# Patient Record
Sex: MALE | Race: WHITE | NOT HISPANIC OR LATINO | Employment: OTHER | URBAN - METROPOLITAN AREA
[De-identification: names, ages, dates, MRNs, and addresses within clinical notes are randomized per-mention and may not be internally consistent; named-entity substitution may affect disease eponyms.]

---

## 2019-02-15 ENCOUNTER — HOSPITAL ENCOUNTER (EMERGENCY)
Facility: HOSPITAL | Age: 44
Discharge: HOME | End: 2019-02-15
Attending: EMERGENCY MEDICINE | Admitting: EMERGENCY MEDICINE
Payer: COMMERCIAL

## 2019-02-15 ENCOUNTER — APPOINTMENT (EMERGENCY)
Dept: RADIOLOGY | Facility: HOSPITAL | Age: 44
End: 2019-02-15
Payer: COMMERCIAL

## 2019-02-15 VITALS
HEIGHT: 73 IN | TEMPERATURE: 96.7 F | HEART RATE: 68 BPM | DIASTOLIC BLOOD PRESSURE: 72 MMHG | SYSTOLIC BLOOD PRESSURE: 117 MMHG | BODY MASS INDEX: 28.49 KG/M2 | WEIGHT: 215 LBS | OXYGEN SATURATION: 96 % | RESPIRATION RATE: 16 BRPM

## 2019-02-15 DIAGNOSIS — R23.3 PETECHIAL RASH: ICD-10-CM

## 2019-02-15 DIAGNOSIS — B37.89 CANDIDA RASH OF GROIN: ICD-10-CM

## 2019-02-15 DIAGNOSIS — R79.1 ELEVATED PARTIAL THROMBOPLASTIN TIME (PTT): ICD-10-CM

## 2019-02-15 DIAGNOSIS — R53.83 FATIGUE: ICD-10-CM

## 2019-02-15 DIAGNOSIS — R53.1 GENERALIZED WEAKNESS: Primary | ICD-10-CM

## 2019-02-15 LAB
ALBUMIN SERPL BCP-MCNC: 4.3 G/DL (ref 3.5–5.7)
ALP SERPL-CCNC: 80 U/L (ref 40–150)
ALT SERPL W P-5'-P-CCNC: 25 U/L (ref 7–52)
ANION GAP SERPL CALCULATED.3IONS-SCNC: 7 MMOL/L (ref 4–13)
APTT PPP: 53 SECONDS (ref 26–38)
AST SERPL W P-5'-P-CCNC: 20 U/L (ref 13–39)
BACTERIA UR QL AUTO: ABNORMAL /HPF
BASOPHILS # BLD AUTO: 0 THOUSANDS/ΜL (ref 0–0.1)
BASOPHILS NFR BLD AUTO: 1 % (ref 0–2)
BILIRUB SERPL-MCNC: 0.4 MG/DL (ref 0.2–1)
BILIRUB UR QL STRIP: NEGATIVE
BUN SERPL-MCNC: 14 MG/DL (ref 7–25)
CALCIUM SERPL-MCNC: 9 MG/DL (ref 8.6–10.5)
CHLORIDE SERPL-SCNC: 101 MMOL/L (ref 98–107)
CLARITY UR: CLEAR
CO2 SERPL-SCNC: 29 MMOL/L (ref 21–31)
COLOR UR: YELLOW
CREAT SERPL-MCNC: 1.07 MG/DL (ref 0.7–1.3)
CRP SERPL QL: <3 MG/L
EOSINOPHIL # BLD AUTO: 0.2 THOUSAND/ΜL (ref 0–0.61)
EOSINOPHIL NFR BLD AUTO: 4 % (ref 0–5)
ERYTHROCYTE [DISTWIDTH] IN BLOOD BY AUTOMATED COUNT: 14 % (ref 11.5–14.5)
ERYTHROCYTE [SEDIMENTATION RATE] IN BLOOD: 2 MM/HOUR (ref 0–15)
FLUAV AG SPEC QL IA: NEGATIVE
FLUAV AG SPEC QL: NOT DETECTED
FLUBV AG SPEC QL IA: NEGATIVE
FLUBV AG SPEC QL: NOT DETECTED
GFR SERPL CREATININE-BSD FRML MDRD: 84 ML/MIN/1.73SQ M
GLUCOSE SERPL-MCNC: 91 MG/DL (ref 65–99)
GLUCOSE UR STRIP-MCNC: NEGATIVE MG/DL
HCT VFR BLD AUTO: 47.5 % (ref 42–47)
HGB BLD-MCNC: 15.8 G/DL (ref 14–18)
HGB UR QL STRIP.AUTO: ABNORMAL
INR PPP: 0.96 (ref 0.9–1.5)
KETONES UR STRIP-MCNC: NEGATIVE MG/DL
LEUKOCYTE ESTERASE UR QL STRIP: NEGATIVE
LYMPHOCYTES # BLD AUTO: 1.3 THOUSANDS/ΜL (ref 0.6–4.47)
LYMPHOCYTES NFR BLD AUTO: 21 % (ref 21–51)
MCH RBC QN AUTO: 29.1 PG (ref 26–34)
MCHC RBC AUTO-ENTMCNC: 33.2 G/DL (ref 31–37)
MCV RBC AUTO: 88 FL (ref 81–99)
MONOCYTES # BLD AUTO: 0.6 THOUSAND/ΜL (ref 0.17–1.22)
MONOCYTES NFR BLD AUTO: 9 % (ref 2–12)
MUCOUS THREADS UR QL AUTO: ABNORMAL
NEUTROPHILS # BLD AUTO: 4.1 THOUSANDS/ΜL (ref 1.4–6.5)
NEUTS SEG NFR BLD AUTO: 66 % (ref 42–75)
NITRITE UR QL STRIP: NEGATIVE
NON-SQ EPI CELLS URNS QL MICRO: ABNORMAL /HPF
NRBC BLD AUTO-RTO: 0 /100 WBCS
PH UR STRIP.AUTO: 6 [PH] (ref 5–8)
PLATELET # BLD AUTO: 271 THOUSANDS/UL (ref 149–390)
PMV BLD AUTO: 7.7 FL (ref 8.6–11.7)
POTASSIUM SERPL-SCNC: 3.8 MMOL/L (ref 3.5–5.5)
PROT SERPL-MCNC: 6.7 G/DL (ref 6.4–8.9)
PROT UR STRIP-MCNC: NEGATIVE MG/DL
PROTHROMBIN TIME: 11.1 SECONDS (ref 10.2–13)
RBC # BLD AUTO: 5.43 MILLION/UL (ref 4.3–5.9)
RBC #/AREA URNS AUTO: ABNORMAL /HPF
RSV B RNA SPEC QL NAA+PROBE: NOT DETECTED
SODIUM SERPL-SCNC: 137 MMOL/L (ref 134–143)
SP GR UR STRIP.AUTO: 1.02 (ref 1–1.03)
UROBILINOGEN UR QL STRIP.AUTO: 0.2 E.U./DL
WBC # BLD AUTO: 6.3 THOUSAND/UL (ref 4.8–10.8)
WBC #/AREA URNS AUTO: ABNORMAL /HPF

## 2019-02-15 PROCEDURE — 99285 EMERGENCY DEPT VISIT HI MDM: CPT

## 2019-02-15 PROCEDURE — 86664 EPSTEIN-BARR NUCLEAR ANTIGEN: CPT | Performed by: PHYSICIAN ASSISTANT

## 2019-02-15 PROCEDURE — 71046 X-RAY EXAM CHEST 2 VIEWS: CPT

## 2019-02-15 PROCEDURE — 86645 CMV ANTIBODY IGM: CPT | Performed by: PHYSICIAN ASSISTANT

## 2019-02-15 PROCEDURE — 86663 EPSTEIN-BARR ANTIBODY: CPT | Performed by: PHYSICIAN ASSISTANT

## 2019-02-15 PROCEDURE — 85730 THROMBOPLASTIN TIME PARTIAL: CPT | Performed by: PHYSICIAN ASSISTANT

## 2019-02-15 PROCEDURE — 85025 COMPLETE CBC W/AUTO DIFF WBC: CPT | Performed by: PHYSICIAN ASSISTANT

## 2019-02-15 PROCEDURE — 81003 URINALYSIS AUTO W/O SCOPE: CPT | Performed by: PHYSICIAN ASSISTANT

## 2019-02-15 PROCEDURE — 36415 COLL VENOUS BLD VENIPUNCTURE: CPT | Performed by: PHYSICIAN ASSISTANT

## 2019-02-15 PROCEDURE — 80053 COMPREHEN METABOLIC PANEL: CPT | Performed by: PHYSICIAN ASSISTANT

## 2019-02-15 PROCEDURE — 86140 C-REACTIVE PROTEIN: CPT | Performed by: PHYSICIAN ASSISTANT

## 2019-02-15 PROCEDURE — 86617 LYME DISEASE ANTIBODY: CPT | Performed by: PHYSICIAN ASSISTANT

## 2019-02-15 PROCEDURE — 87631 RESP VIRUS 3-5 TARGETS: CPT | Performed by: PHYSICIAN ASSISTANT

## 2019-02-15 PROCEDURE — 85610 PROTHROMBIN TIME: CPT | Performed by: PHYSICIAN ASSISTANT

## 2019-02-15 PROCEDURE — 86644 CMV ANTIBODY: CPT | Performed by: PHYSICIAN ASSISTANT

## 2019-02-15 PROCEDURE — 81001 URINALYSIS AUTO W/SCOPE: CPT | Performed by: PHYSICIAN ASSISTANT

## 2019-02-15 PROCEDURE — 85652 RBC SED RATE AUTOMATED: CPT | Performed by: PHYSICIAN ASSISTANT

## 2019-02-15 PROCEDURE — 86665 EPSTEIN-BARR CAPSID VCA: CPT | Performed by: PHYSICIAN ASSISTANT

## 2019-02-15 RX ORDER — CLOTRIMAZOLE 1 %
CREAM (GRAM) TOPICAL
Qty: 15 G | Refills: 0 | Status: SHIPPED | OUTPATIENT
Start: 2019-02-15 | End: 2019-05-14 | Stop reason: ALTCHOICE

## 2019-02-15 NOTE — ED PROVIDER NOTES
History  Chief Complaint   Patient presents with    Fatigue    Generalized Body Aches    Weakness - Generalized     Patient presents emergency room with wife after a 2 week onset of generalized body rash  He states then over the past 3-4 days he has developed increased fatigue and generalized muscle aches  He denies any additional symptoms chest pain shortness of breath headache lightheaded dizziness nausea vomiting abdominal pain urinary complaints  Has not been outside of the country denies recent camping any recent sick contacts does state he is outdoors a lot but denies any tick or additional blood bites  Denies family history of clotting or blood disorders  Patient states he took some Tylenol with minimal relief no additional treatment  He states 2 weeks ago and the rash began he saw his PCP was given Diflucan for fungal rash but rashes not disappeared at this point  Denies fevers or chills at home wife is concerned due to his fatigue and they presents emergency room for further evaluation        History provided by:  Patient and spouse   used: No    Fatigue   Severity:  Moderate  Onset quality:  Sudden  Duration:  3 days  Timing:  Constant  Progression:  Unchanged  Chronicity:  New  Relieved by:  Nothing  Worsened by:  Nothing  Ineffective treatments:  Medication (tylenol)  Associated symptoms: myalgias    Associated symptoms: no abdominal pain, no arthralgias, no ataxia, no chest pain, no cough, no diarrhea, no difficulty walking, no dizziness, no dysphagia, no dysuria, no numbness in extremities, no fever, no foul-smelling urine, no frequency, no headaches, no nausea, no near-syncope, no seizures, no shortness of breath, no stroke symptoms, no syncope, no urgency, no vision change and no vomiting    Risk factors: no anemia, no coronary artery disease, no diabetes, no family hx of stroke, no heart disease, no neurologic disease, no new medications and no recent stressors      No Known Allergies      None       History reviewed  No pertinent past medical history  Past Surgical History:   Procedure Laterality Date    ORCHIECTOMY Right        History reviewed  No pertinent family history  I have reviewed and agree with the history as documented  Social History     Tobacco Use    Smoking status: Never Smoker    Smokeless tobacco: Never Used   Substance Use Topics    Alcohol use: Not Currently     Frequency: Never    Drug use: Never        Review of Systems   Constitutional: Positive for fatigue  Negative for chills, diaphoresis and fever  HENT: Negative for congestion, ear pain, rhinorrhea, sneezing and sore throat  Respiratory: Negative for cough, shortness of breath, wheezing and stridor  Cardiovascular: Negative for chest pain, palpitations, leg swelling, syncope and near-syncope  Gastrointestinal: Negative for abdominal distention, abdominal pain, blood in stool, constipation, diarrhea, dysphagia, nausea and vomiting  Genitourinary: Negative for difficulty urinating, dysuria, frequency, hematuria and urgency  Musculoskeletal: Positive for myalgias  Negative for arthralgias, gait problem and neck pain  Skin: Positive for rash  Neurological: Positive for weakness  Negative for dizziness, seizures, syncope, facial asymmetry, speech difficulty, light-headedness, numbness and headaches  All other systems reviewed and are negative  Physical Exam  Physical Exam   Constitutional: He is oriented to person, place, and time  He appears well-developed and well-nourished  HENT:   Head: Normocephalic and atraumatic  Right Ear: External ear normal    Left Ear: External ear normal    Nose: Nose normal    Mouth/Throat: Oropharynx is clear and moist  No oropharyngeal exudate  Eyes: Pupils are equal, round, and reactive to light  Conjunctivae and EOM are normal    Neck: Normal range of motion  Neck supple  No tracheal deviation present     Cardiovascular: Normal rate, regular rhythm, normal heart sounds and intact distal pulses  No murmur heard  Pulmonary/Chest: Effort normal and breath sounds normal  No respiratory distress  He has no wheezes  He has no rales  Abdominal: Soft  Bowel sounds are normal  He exhibits no distension  There is no tenderness  Musculoskeletal: Normal range of motion  He exhibits no edema or tenderness  Neurological: He is alert and oriented to person, place, and time  Skin: Skin is warm and dry  Petechiae noted  No abrasion, no bruising, no ecchymosis, no laceration and no rash noted  Rash is not urticarial  No erythema  Generalized petechial body rash sparing the face, palms and soles and throat  Additionally in the groin patient has slightly erythematous fungal appearing rash does not extend beyond the groin  Nursing note and vitals reviewed  Vital Signs  ED Triage Vitals [02/15/19 1324]   Temperature Pulse Respirations Blood Pressure SpO2   (!) 96 7 °F (35 9 °C) 78 16 121/83 96 %      Temp Source Heart Rate Source Patient Position - Orthostatic VS BP Location FiO2 (%)   Temporal Monitor Sitting Left arm --      Pain Score       9           Vitals:    02/15/19 1324   BP: 121/83   Pulse: 78   Patient Position - Orthostatic VS: Sitting       Visual Acuity      ED Medications  Medications - No data to display    Diagnostic Studies  Results Reviewed     Procedure Component Value Units Date/Time    UA w Reflex to Microscopic w Reflex to Culture [[id number]] Collected:  02/15/19 1531    Lab Status:   In process Specimen:  Urine, Clean Catch Updated:  02/15/19 1551    Sedimentation rate, automated [[id number]]  (Normal) Collected:  02/15/19 1418    Lab Status:  Final result Specimen:  Blood from Arm, Left Updated:  02/15/19 1512     Sed Rate 2 mm/hour     Comprehensive metabolic panel [[id number]] Collected:  02/15/19 1418    Lab Status:  Final result Specimen:  Blood from Arm, Left Updated:  02/15/19 1456     Sodium 137 mmol/L Potassium 3 8 mmol/L      Chloride 101 mmol/L      CO2 29 mmol/L      ANION GAP 7 mmol/L      BUN 14 mg/dL      Creatinine 1 07 mg/dL      Glucose 91 mg/dL      Calcium 9 0 mg/dL      AST 20 U/L      ALT 25 U/L      Alkaline Phosphatase 80 U/L      Total Protein 6 7 g/dL      Albumin 4 3 g/dL      Total Bilirubin 0 40 mg/dL      eGFR 84 ml/min/1 73sq m     Narrative:       National Kidney Disease Education Program recommendations are as follows:  GFR calculation is accurate only with a steady state creatinine  Chronic Kidney disease less than 60 ml/min/1 73 sq  meters  Kidney failure less than 15 ml/min/1 73 sq  meters  Protime-INR [[id number]]  (Normal) Collected:  02/15/19 1418    Lab Status:  Final result Specimen:  Blood from Arm, Left Updated:  02/15/19 1451     Protime 11 1 seconds      INR 0 96    APTT [[id number]]  (Abnormal) Collected:  02/15/19 1418    Lab Status:  Final result Specimen:  Blood from Arm, Left Updated:  02/15/19 1451     PTT 53 seconds     CBC and differential [[id number]]  (Abnormal) Collected:  02/15/19 1418    Lab Status:  Final result Specimen:  Blood from Arm, Left Updated:  02/15/19 1438     WBC 6 30 Thousand/uL      RBC 5 43 Million/uL      Hemoglobin 15 8 g/dL      Hematocrit 47 5 %      MCV 88 fL      MCH 29 1 pg      MCHC 33 2 g/dL      RDW 14 0 %      MPV 7 7 fL      Platelets 119 Thousands/uL      nRBC 0 /100 WBCs      Neutrophils Relative 66 %      Lymphocytes Relative 21 %      Monocytes Relative 9 %      Eosinophils Relative 4 %      Basophils Relative 1 %      Neutrophils Absolute 4 10 Thousands/µL      Lymphocytes Absolute 1 30 Thousands/µL      Monocytes Absolute 0 60 Thousand/µL      Eosinophils Absolute 0 20 Thousand/µL      Basophils Absolute 0 00 Thousands/µL     C-reactive protein [[id number]] Collected:  02/15/19 1418    Lab Status:   In process Specimen:  Blood from Arm, Left Updated:  02/15/19 1432    CMV IgG/IgM Antibodies [[id number]] Collected:  02/15/19 1418 Lab Status: In process Specimen:  Blood from Arm, Left Updated:  02/15/19 1432    EBV acute panel [[id number]] Collected:  02/15/19 1418    Lab Status: In process Specimen:  Blood from Arm, Left Updated:  02/15/19 1432    Lyme disease, western blot [[id number]] Collected:  02/15/19 1418    Lab Status: In process Specimen:  Blood from Arm, Left Updated:  02/15/19 1432    Rapid Influenza Screen with Reflex PCR [[id number]]  (Normal) Collected:  02/15/19 1336    Lab Status:  Final result Specimen:  Nasopharyngeal Swab Updated:  02/15/19 1407     Rapid Influenza A Ag Negative     Rapid Influenza B Ag Negative    INFLUENZA A/B AND RSV, PCR [[id number]] Collected:  02/15/19 1336    Lab Status: In process Specimen:  Nasopharyngeal Swab Updated:  02/15/19 1407                 XR chest 2 views    (Results Pending)              Procedures  Procedures       Phone Contacts  ED Phone Contact    ED Course  ED Course as of Feb 15 1555   Fri Feb 15, 2019   1539 Discussed with patient and wife elevation and PTT  Patient again denied family history of bleeding or clotting disorders  Discussed with patient no acute findings at this time requiring further inpatient stay advised follow up ASAP with PCP for further testing  We will call with results of pending labs if positive  Advised also follow up with hematologist in the future  Return to ER for acute worsening symptoms  Avoid NSAIDs at this time                                    MDM  Number of Diagnoses or Management Options  Candida rash of groin: new and does not require workup  Elevated partial thromboplastin time (PTT): new and requires workup  Fatigue: new and requires workup  Generalized weakness: new and requires workup  Petechial rash: new and requires workup     Amount and/or Complexity of Data Reviewed  Clinical lab tests: reviewed and ordered  Tests in the radiology section of CPT®: ordered and reviewed  Independent visualization of images, tracings, or specimens: yes    Risk of Complications, Morbidity, and/or Mortality  Presenting problems: moderate  Diagnostic procedures: moderate  Management options: moderate    Patient Progress  Patient progress: stable      Disposition  Final diagnoses:   Generalized weakness   Fatigue   Petechial rash   Elevated partial thromboplastin time (PTT)   Candida rash of groin     Time reflects when diagnosis was documented in both MDM as applicable and the Disposition within this note     Time User Action Codes Description Comment    2/15/2019  3:41 PM Zannie Bussing Add [R53 1] Generalized weakness     2/15/2019  3:41 PM Gaynel Burows M Add [R53 83] Fatigue     2/15/2019  3:42 PM Zannie Bussing Add [R23 3] Petechial rash     2/15/2019  3:44 PM Gaynel Burows M Add [R79 1] Elevated partial thromboplastin time (PTT)     2/15/2019  3:52 PM Gaynel Burows M Add [B37 89] Candida rash of groin       ED Disposition     ED Disposition Condition Date/Time Comment    Discharge Stable Fri Feb 15, 2019  3:41 PM Elaine Mccrary [facility] discharge to home/self care  Follow-up Information     Follow up With Specialties Details Why Contact Info Additional Information    Jeny Michelle, DO Family Medicine In 1 week If symptoms worsen return to ER  [address] [facility] [phone]       Rodney Almazan [facility] In 1 week If symptoms worsen  [address]ing ALDANA [address] [facility], [address], [place], [address]          Patient's Medications   Discharge Prescriptions    CLOTRIMAZOLE (LOTRIMIN) 1 % CREAM    Apply to affected area 2 times daily       Start Date: 2/15/2019 End Date: --       Order Dose: --       Quantity: 15 g    Refills: 0     No discharge procedures on file      ED Provider  Electronically Signed by           Jessie Stewart PA-C  02/15/19 1799

## 2019-02-16 LAB
CMV IGG SERPL IA-ACNC: >10 U/ML (ref 0–0.59)
CMV IGM SERPL IA-ACNC: <30 AU/ML (ref 0–29.9)
EBV EA IGG SER-ACNC: >150 U/ML (ref 0–8.9)
EBV NA IGG SER IA-ACNC: 472 U/ML (ref 0–17.9)
EBV PATRN SPEC IB-IMP: ABNORMAL
EBV VCA IGG SER IA-ACNC: 600 U/ML (ref 0–17.9)
EBV VCA IGM SER IA-ACNC: <36 U/ML (ref 0–35.9)

## 2019-02-17 LAB
B BURGDOR IGG PATRN SER IB-IMP: NEGATIVE
B BURGDOR IGM PATRN SER IB-IMP: NEGATIVE
B BURGDOR18KD IGG SER QL IB: ABNORMAL
B BURGDOR23KD IGG SER QL IB: PRESENT
B BURGDOR23KD IGM SER QL IB: ABNORMAL
B BURGDOR28KD IGG SER QL IB: ABNORMAL
B BURGDOR30KD IGG SER QL IB: ABNORMAL
B BURGDOR39KD IGG SER QL IB: ABNORMAL
B BURGDOR39KD IGM SER QL IB: ABNORMAL
B BURGDOR41KD IGG SER QL IB: ABNORMAL
B BURGDOR41KD IGM SER QL IB: ABNORMAL
B BURGDOR45KD IGG SER QL IB: ABNORMAL
B BURGDOR58KD IGG SER QL IB: ABNORMAL
B BURGDOR66KD IGG SER QL IB: ABNORMAL
B BURGDOR93KD IGG SER QL IB: ABNORMAL

## 2019-02-18 ENCOUNTER — TELEPHONE (OUTPATIENT)
Dept: EMERGENCY DEPT | Facility: HOSPITAL | Age: 44
End: 2019-02-18

## 2019-05-13 ENCOUNTER — OFFICE VISIT (OUTPATIENT)
Dept: URGENT CARE | Facility: CLINIC | Age: 44
End: 2019-05-13
Payer: COMMERCIAL

## 2019-05-13 ENCOUNTER — APPOINTMENT (OUTPATIENT)
Dept: RADIOLOGY | Facility: CLINIC | Age: 44
End: 2019-05-13
Payer: COMMERCIAL

## 2019-05-13 VITALS
OXYGEN SATURATION: 99 % | DIASTOLIC BLOOD PRESSURE: 68 MMHG | SYSTOLIC BLOOD PRESSURE: 110 MMHG | RESPIRATION RATE: 20 BRPM | TEMPERATURE: 97 F | HEART RATE: 88 BPM

## 2019-05-13 DIAGNOSIS — M25.562 ACUTE PAIN OF LEFT KNEE: Primary | ICD-10-CM

## 2019-05-13 DIAGNOSIS — M25.562 ACUTE PAIN OF LEFT KNEE: ICD-10-CM

## 2019-05-13 PROCEDURE — 73564 X-RAY EXAM KNEE 4 OR MORE: CPT

## 2019-05-13 PROCEDURE — 99203 OFFICE O/P NEW LOW 30 MIN: CPT | Performed by: PHYSICIAN ASSISTANT

## 2019-05-14 VITALS
RESPIRATION RATE: 16 BRPM | WEIGHT: 215 LBS | DIASTOLIC BLOOD PRESSURE: 75 MMHG | HEIGHT: 73 IN | HEART RATE: 86 BPM | BODY MASS INDEX: 28.49 KG/M2 | SYSTOLIC BLOOD PRESSURE: 112 MMHG

## 2019-05-14 DIAGNOSIS — M25.462 EFFUSION OF LEFT KNEE: ICD-10-CM

## 2019-05-14 DIAGNOSIS — M25.362 KNEE INSTABILITY, LEFT: ICD-10-CM

## 2019-05-14 DIAGNOSIS — S89.92XA LEFT KNEE INJURY, INITIAL ENCOUNTER: Primary | ICD-10-CM

## 2019-05-14 PROCEDURE — 20610 DRAIN/INJ JOINT/BURSA W/O US: CPT | Performed by: FAMILY MEDICINE

## 2019-05-14 PROCEDURE — 99213 OFFICE O/P EST LOW 20 MIN: CPT | Performed by: FAMILY MEDICINE

## 2019-05-14 RX ORDER — TRIAMCINOLONE ACETONIDE 40 MG/ML
40 INJECTION, SUSPENSION INTRA-ARTICULAR; INTRAMUSCULAR
Status: COMPLETED | OUTPATIENT
Start: 2019-05-14 | End: 2019-05-14

## 2019-05-14 RX ORDER — MELOXICAM 15 MG/1
15 TABLET ORAL DAILY
Qty: 30 TABLET | Refills: 1 | Status: SHIPPED | OUTPATIENT
Start: 2019-05-14 | End: 2019-06-25 | Stop reason: ALTCHOICE

## 2019-05-14 RX ORDER — LIDOCAINE HYDROCHLORIDE 10 MG/ML
4 INJECTION, SOLUTION INFILTRATION; PERINEURAL
Status: COMPLETED | OUTPATIENT
Start: 2019-05-14 | End: 2019-05-14

## 2019-05-14 RX ORDER — LIDOCAINE HYDROCHLORIDE 10 MG/ML
5 INJECTION, SOLUTION INFILTRATION; PERINEURAL
Status: COMPLETED | OUTPATIENT
Start: 2019-05-14 | End: 2019-05-14

## 2019-05-14 RX ADMIN — TRIAMCINOLONE ACETONIDE 40 MG: 40 INJECTION, SUSPENSION INTRA-ARTICULAR; INTRAMUSCULAR at 11:20

## 2019-05-14 RX ADMIN — LIDOCAINE HYDROCHLORIDE 5 ML: 10 INJECTION, SOLUTION INFILTRATION; PERINEURAL at 11:20

## 2019-05-14 RX ADMIN — LIDOCAINE HYDROCHLORIDE 4 ML: 10 INJECTION, SOLUTION INFILTRATION; PERINEURAL at 11:20

## 2019-05-16 ENCOUNTER — EVALUATION (OUTPATIENT)
Dept: PHYSICAL THERAPY | Facility: CLINIC | Age: 44
End: 2019-05-16
Payer: COMMERCIAL

## 2019-05-16 DIAGNOSIS — S89.92XD LEFT KNEE INJURY, SUBSEQUENT ENCOUNTER: Primary | ICD-10-CM

## 2019-05-16 DIAGNOSIS — M25.362 PATELLAR INSTABILITY OF LEFT KNEE: ICD-10-CM

## 2019-05-16 PROCEDURE — 97535 SELF CARE MNGMENT TRAINING: CPT | Performed by: PHYSICAL MEDICINE & REHABILITATION

## 2019-05-16 PROCEDURE — 97110 THERAPEUTIC EXERCISES: CPT | Performed by: PHYSICAL MEDICINE & REHABILITATION

## 2019-05-16 PROCEDURE — 97161 PT EVAL LOW COMPLEX 20 MIN: CPT | Performed by: PHYSICAL MEDICINE & REHABILITATION

## 2019-05-21 ENCOUNTER — OFFICE VISIT (OUTPATIENT)
Dept: PHYSICAL THERAPY | Facility: CLINIC | Age: 44
End: 2019-05-21
Payer: COMMERCIAL

## 2019-05-21 DIAGNOSIS — M25.362 PATELLAR INSTABILITY OF LEFT KNEE: ICD-10-CM

## 2019-05-21 DIAGNOSIS — S89.92XD LEFT KNEE INJURY, SUBSEQUENT ENCOUNTER: Primary | ICD-10-CM

## 2019-05-21 PROCEDURE — 97110 THERAPEUTIC EXERCISES: CPT | Performed by: PHYSICAL THERAPIST

## 2019-06-04 ENCOUNTER — OFFICE VISIT (OUTPATIENT)
Dept: PHYSICAL THERAPY | Facility: CLINIC | Age: 44
End: 2019-06-04
Payer: COMMERCIAL

## 2019-06-04 DIAGNOSIS — S89.92XD LEFT KNEE INJURY, SUBSEQUENT ENCOUNTER: Primary | ICD-10-CM

## 2019-06-04 DIAGNOSIS — M25.362 PATELLAR INSTABILITY OF LEFT KNEE: ICD-10-CM

## 2019-06-04 PROCEDURE — 97110 THERAPEUTIC EXERCISES: CPT

## 2019-06-11 ENCOUNTER — OFFICE VISIT (OUTPATIENT)
Dept: PHYSICAL THERAPY | Facility: CLINIC | Age: 44
End: 2019-06-11
Payer: COMMERCIAL

## 2019-06-11 DIAGNOSIS — M25.362 PATELLAR INSTABILITY OF LEFT KNEE: ICD-10-CM

## 2019-06-11 DIAGNOSIS — S89.92XD LEFT KNEE INJURY, SUBSEQUENT ENCOUNTER: Primary | ICD-10-CM

## 2019-06-11 PROCEDURE — 97110 THERAPEUTIC EXERCISES: CPT

## 2019-06-18 ENCOUNTER — APPOINTMENT (OUTPATIENT)
Dept: PHYSICAL THERAPY | Facility: CLINIC | Age: 44
End: 2019-06-18
Payer: COMMERCIAL

## 2019-06-25 ENCOUNTER — OFFICE VISIT (OUTPATIENT)
Dept: PHYSICAL THERAPY | Facility: CLINIC | Age: 44
End: 2019-06-25
Payer: COMMERCIAL

## 2019-06-25 ENCOUNTER — OFFICE VISIT (OUTPATIENT)
Dept: OBGYN CLINIC | Facility: CLINIC | Age: 44
End: 2019-06-25
Payer: COMMERCIAL

## 2019-06-25 VITALS
DIASTOLIC BLOOD PRESSURE: 79 MMHG | HEIGHT: 73 IN | RESPIRATION RATE: 16 BRPM | HEART RATE: 90 BPM | SYSTOLIC BLOOD PRESSURE: 115 MMHG | BODY MASS INDEX: 27.25 KG/M2 | WEIGHT: 205.6 LBS

## 2019-06-25 DIAGNOSIS — M25.362 KNEE INSTABILITY, LEFT: ICD-10-CM

## 2019-06-25 DIAGNOSIS — S89.92XD LEFT KNEE INJURY, SUBSEQUENT ENCOUNTER: Primary | ICD-10-CM

## 2019-06-25 DIAGNOSIS — M25.362 PATELLAR INSTABILITY OF LEFT KNEE: ICD-10-CM

## 2019-06-25 PROCEDURE — 99213 OFFICE O/P EST LOW 20 MIN: CPT | Performed by: FAMILY MEDICINE

## 2019-06-25 PROCEDURE — 97110 THERAPEUTIC EXERCISES: CPT

## 2019-07-30 ENCOUNTER — OFFICE VISIT (OUTPATIENT)
Dept: OBGYN CLINIC | Facility: CLINIC | Age: 44
End: 2019-07-30
Payer: COMMERCIAL

## 2019-07-30 VITALS
DIASTOLIC BLOOD PRESSURE: 73 MMHG | SYSTOLIC BLOOD PRESSURE: 111 MMHG | RESPIRATION RATE: 16 BRPM | WEIGHT: 209.6 LBS | HEIGHT: 73 IN | HEART RATE: 88 BPM | BODY MASS INDEX: 27.78 KG/M2

## 2019-07-30 DIAGNOSIS — R29.898 KNEE CLICKING: ICD-10-CM

## 2019-07-30 DIAGNOSIS — Z13.89 ENCOUNTER FOR IMAGING TO SCREEN FOR METAL PRIOR TO MRI: ICD-10-CM

## 2019-07-30 DIAGNOSIS — M25.562 ACUTE PAIN OF LEFT KNEE: Primary | ICD-10-CM

## 2019-07-30 PROCEDURE — 99213 OFFICE O/P EST LOW 20 MIN: CPT | Performed by: FAMILY MEDICINE

## 2019-07-30 NOTE — PROGRESS NOTES
Assessment/Plan:  Assessment/Plan   Diagnoses and all orders for this visit:    Acute pain of left knee  -     MRI knee left  wo contrast; Future    Knee clicking  -     MRI knee left  wo contrast; Future    Encounter for imaging to screen for metal prior to MRI  -     XR orbits for foreign body; Future        70-year-old active male with left knee pain and clicking more 10 weeks duration  Discussed with patient physical exam, impression and plan  Physical exam is noted for tenderness of the medial joint line and patellar undersurface  He has normal range of motion of the knee  There is pain at the medial aspect with Radha's  He is now more than 10 weeks since onset of pain and still symptomatic with pain and clicking of the knee despite conservative management a form of having had corticosteroid injection, meloxicam 15 mg once daily, and doing formal physical therapy since 05/16/2019  At this time I will refer him for MRI of left knee to evaluate for internal derangement, as surgical intervention may be warranted  He will follow up with me after getting MRI done  Subjective:   Patient ID: Alexus Lemus is a 38 y o  male  Chief Complaint   Patient presents with    Left Knee - Pain, Follow-up       70-year-old active male following up for left knee pain instability more than 10 months duration  He was last seen 5 weeks ago at which point he was advised to continue with formal physical therapy, joint supplements, and meloxicam 15 mg once daily  He has been doing formal physical therapy and home exercises since 05/16/2019  He has also been taking meloxicam 15 mg once daily  He reports mild improvement in his symptoms  He still has pain described as localized to the medial aspect of knee, intermittent, sharp, nonradiating, worse with twisting and bending, associated with swelling, and improved with rest   He has not had any new injury since his last visit  Knee Pain   This is a new problem   The current episode started more than 1 month ago  The problem occurs intermittently  The problem has been gradually improving  Associated symptoms include arthralgias and joint swelling  Pertinent negatives include no numbness or weakness  The symptoms are aggravated by twisting and walking  He has tried rest and NSAIDs (Corticosteroid injection, physical therapy) for the symptoms  The treatment provided mild relief  Review of Systems   Musculoskeletal: Positive for arthralgias and joint swelling  Neurological: Negative for weakness and numbness  Objective:  Vitals:    07/30/19 1317   BP: 111/73   BP Location: Left arm   Patient Position: Sitting   Cuff Size: Large   Pulse: 88   Resp: 16   Weight: 95 1 kg (209 lb 9 6 oz)   Height: 6' 1" (1 854 m)     Left Knee Exam     Muscle Strength   The patient has normal left knee strength  Tenderness   The patient is experiencing tenderness in the medial joint line (Patellar undersurface)  Range of Motion   The patient has normal left knee ROM  Tests   Radha:  Medial - positive   Varus: negative Valgus: negative    Other   Swelling: none  Effusion: no effusion present          Observations   Left Knee   Negative for effusion  Physical Exam   Constitutional: He is oriented to person, place, and time  He appears well-developed  No distress  HENT:   Head: Normocephalic and atraumatic  Eyes: Conjunctivae are normal    Neck: No tracheal deviation present  Cardiovascular: Normal rate  Pulmonary/Chest: Effort normal  No respiratory distress  Abdominal: He exhibits no distension  Musculoskeletal:        Left knee: He exhibits no effusion  Neurological: He is alert and oriented to person, place, and time  Skin: Skin is warm and dry  Psychiatric: He has a normal mood and affect  His behavior is normal    Nursing note and vitals reviewed

## 2019-08-06 ENCOUNTER — HOSPITAL ENCOUNTER (OUTPATIENT)
Dept: RADIOLOGY | Facility: HOSPITAL | Age: 44
Discharge: HOME | End: 2019-08-06

## 2019-08-06 DIAGNOSIS — Z13.89 ENCOUNTER FOR IMAGING TO SCREEN FOR METAL PRIOR TO MRI: ICD-10-CM

## 2019-08-07 ENCOUNTER — HOSPITAL ENCOUNTER (OUTPATIENT)
Dept: MRI IMAGING | Facility: HOSPITAL | Age: 44
Discharge: HOME | End: 2019-08-07
Payer: COMMERCIAL

## 2019-08-07 DIAGNOSIS — M25.562 ACUTE PAIN OF LEFT KNEE: ICD-10-CM

## 2019-08-07 DIAGNOSIS — R29.898 KNEE CLICKING: ICD-10-CM

## 2019-08-07 PROCEDURE — 73721 MRI JNT OF LWR EXTRE W/O DYE: CPT

## 2019-08-13 ENCOUNTER — OFFICE VISIT (OUTPATIENT)
Dept: OBGYN CLINIC | Facility: CLINIC | Age: 44
End: 2019-08-13
Payer: COMMERCIAL

## 2019-08-13 VITALS
SYSTOLIC BLOOD PRESSURE: 110 MMHG | WEIGHT: 209 LBS | DIASTOLIC BLOOD PRESSURE: 73 MMHG | HEIGHT: 73 IN | RESPIRATION RATE: 16 BRPM | HEART RATE: 92 BPM | BODY MASS INDEX: 27.7 KG/M2

## 2019-08-13 DIAGNOSIS — S83.242D OTHER TEAR OF MEDIAL MENISCUS OF LEFT KNEE AS CURRENT INJURY, SUBSEQUENT ENCOUNTER: Primary | ICD-10-CM

## 2019-08-13 PROCEDURE — 99213 OFFICE O/P EST LOW 20 MIN: CPT | Performed by: FAMILY MEDICINE

## 2019-08-13 NOTE — PROGRESS NOTES
Assessment/Plan:  Assessment/Plan   Diagnoses and all orders for this visit:    Other tear of medial meniscus of left knee as current injury, subsequent encounter  -     Ambulatory referral to Orthopedic Surgery; Future        69-year-old active male with left knee pain and clicking nearly 3 months duration  Discussed with patient MRI results, impression and plan  MRI of the left knee noted for blunting of the free edge of medial meniscus consistent with tear including apparent displaced meniscal fragment  He has nearly 3 months since onset of symptoms and still symptomatic with intermittent pain and difficulty with bending, and popping of the knee  I discussed with patient that given the findings of displaced fragment in the knee he should be evaluated by orthopedic surgeon to which he agreed  He may continue with physical activity as tolerated and I will refer him to orthopedic surgeon for further evaluation and recommendation treatment  Subjective:   Patient ID: Lori Hinojosa is a 38 y o  male  Chief Complaint   Patient presents with    Left Knee - Pain, Follow-up       69-year-old active male following up for left knee pain of 3 months duration  He was last seen 2 weeks ago which point he was referred for MRI of the knee  Today he reports still having symptoms described as pain and popping  Pain described as localized mainly to the medial aspect of knee, intermittent, achy and sometimes sharp, worse with bending and repetitive activity, and improved with rest   He has had mild buckling  He has done formal physical therapy has been continuing with home exercise  To this point his treatment course consisted of corticosteroid injection, meloxicam, and formal physical therapy  Knee Pain   This is a new problem  The current episode started more than 1 month ago  The problem occurs intermittently  The problem has been gradually improving  Associated symptoms include arthralgias   Pertinent negatives include no joint swelling, numbness or weakness  The symptoms are aggravated by twisting (Bending)  He has tried rest and NSAIDs (Physical therapy, corticosteroid injection) for the symptoms  The treatment provided moderate relief  Review of Systems   Musculoskeletal: Positive for arthralgias  Negative for joint swelling  Neurological: Negative for weakness and numbness  Objective:  Vitals:    08/13/19 1504   BP: 110/73   BP Location: Left arm   Patient Position: Sitting   Cuff Size: Large   Pulse: 92   Resp: 16   Weight: 94 8 kg (209 lb)   Height: 6' 1" (1 854 m)     Left Knee Exam     Muscle Strength   The patient has normal left knee strength  Tenderness   The patient is experiencing no tenderness  Range of Motion   Extension: normal   Flexion: 130             Physical Exam   Constitutional: He is oriented to person, place, and time  He appears well-developed  No distress  HENT:   Head: Normocephalic and atraumatic  Eyes: Conjunctivae are normal    Neck: No tracheal deviation present  Cardiovascular: Normal rate  Pulmonary/Chest: Effort normal  No respiratory distress  Abdominal: He exhibits no distension  Neurological: He is alert and oriented to person, place, and time  Skin: Skin is warm and dry  Psychiatric: He has a normal mood and affect  His behavior is normal    Nursing note and vitals reviewed  I have personally reviewed pertinent films in PACS and my interpretation is Medial meniscus tear

## 2019-08-14 ENCOUNTER — OFFICE VISIT (OUTPATIENT)
Dept: OBGYN CLINIC | Facility: CLINIC | Age: 44
End: 2019-08-14
Payer: COMMERCIAL

## 2019-08-14 VITALS
DIASTOLIC BLOOD PRESSURE: 78 MMHG | HEIGHT: 74 IN | BODY MASS INDEX: 26.69 KG/M2 | HEART RATE: 92 BPM | WEIGHT: 208 LBS | SYSTOLIC BLOOD PRESSURE: 117 MMHG

## 2019-08-14 DIAGNOSIS — M25.562 CHRONIC PAIN OF LEFT KNEE: Primary | ICD-10-CM

## 2019-08-14 DIAGNOSIS — S83.242D OTHER TEAR OF MEDIAL MENISCUS OF LEFT KNEE AS CURRENT INJURY, SUBSEQUENT ENCOUNTER: ICD-10-CM

## 2019-08-14 DIAGNOSIS — G89.29 CHRONIC PAIN OF LEFT KNEE: Primary | ICD-10-CM

## 2019-08-14 PROBLEM — S83.242A TEAR OF MEDIAL MENISCUS OF LEFT KNEE, CURRENT: Status: ACTIVE | Noted: 2019-08-14

## 2019-08-14 PROCEDURE — 99214 OFFICE O/P EST MOD 30 MIN: CPT | Performed by: ORTHOPAEDIC SURGERY

## 2019-08-14 RX ORDER — CLOTRIMAZOLE AND BETAMETHASONE DIPROPIONATE 10; .64 MG/G; MG/G
CREAM TOPICAL 2 TIMES DAILY
Status: ON HOLD | COMMUNITY
End: 2019-09-12 | Stop reason: ALTCHOICE

## 2019-08-14 RX ORDER — CEFAZOLIN SODIUM 2 G/50ML
2000 SOLUTION INTRAVENOUS ONCE
Status: CANCELLED | OUTPATIENT
Start: 2019-09-12

## 2019-08-14 NOTE — H&P (VIEW-ONLY)
ASSESSMENT/PLAN:    Diagnoses and all orders for this visit:    Chronic pain of left knee    Other tear of medial meniscus of left knee as current injury, subsequent encounter    Plan:  I discussed the risks, benefits, options and alternatives of treatment  Having failed conservative measures, I would recommend arthroscopic surgery  He is agreeable but would like to wait until mid September to proceed with surgery  I do not believe he needs any preoperative testing  He will be seen 1 week postoperatively for his 1st follow-up visit  He was instructed to contact me if questions or concerns were to arise prior to surgery  _____________________________________________________  CHIEF COMPLAINT:  Chief Complaint   Patient presents with    Left Knee - Pain, popping    Knee Pain     pt states he injured knee by twisting and then kneeling down, notice the popping and had diffiuclty strengthing and moving         SUBJECTIVE:  Gill Hanna is a 38y o  year old male who presents for evaluation of his left knee  He has been having symptoms for approximately 3 months, describing 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   He has failed to respond to physical therapy, over-the-counter analgesics and aspiration injection  An MRI was recently obtained and he now has been referred for orthopedic surgical evaluation and treatment  He complains of medial-sided knee pain  He does note improvement in symptoms, but continues to experience pain on a consistent basis  Pain is primarily triggered by twisting activities  He denies pain at rest   He denies any significant morning stiffness  He does note that when his pain is most significant, he feels as if his knee may give out on him      PAST MEDICAL HISTORY:  Past Medical History:   Diagnosis Date    Knee pain, left        PAST SURGICAL HISTORY:  Past Surgical History:   Procedure Laterality Date    ORCHIECTOMY Right FAMILY HISTORY:  Family History   Problem Relation Age of Onset    Diabetes Mother     No Known Problems Father     No Known Problems Sister     No Known Problems Brother        SOCIAL HISTORY:  Social History     Tobacco Use    Smoking status: Former Smoker    Smokeless tobacco: Never Used   Substance Use Topics    Alcohol use: Not Currently     Frequency: Never    Drug use: Never       MEDICATIONS:    Current Outpatient Medications:     clotrimazole-betamethasone (LOTRISONE) 1-0 05 % cream, Apply topically 2 (two) times a day, Disp: , Rfl:     ALLERGIES:  No Known Allergies    Review of systems:   Constitutional: Negative for fatigue, fever or loss of apetite  HENT: Negative  Respiratory: Negative for shortness of breath, dyspnea  Cardiovascular: Negative for chest pain/tightness  Gastrointestinal: Negative for abdominal pain, N/V  Endocrine: Negative for cold/heat intolerance, unexplained weight loss/gain  Genitourinary: Negative for flank pain, dysuria, hematuria  Musculoskeletal:  Positive as in the HPI   Skin: Negative for rash  Neurological:  Negative  Psychiatric/Behavioral: Negative for agitation  _____________________________________________________  PHYSICAL EXAMINATION:    Blood pressure 117/78, pulse 92, height 6' 2" (1 88 m), weight 94 3 kg (208 lb)  General: well developed and well nourished, alert, oriented times 3 and appears comfortable  HEENT: Benign, normocephalic, atraumatic  Cardiovascular:  Regular    Pulmonary: No wheezing or stridor  Abdomen: Soft, Nontender  Skin:  No lacerations or abrasions  Neurovascular: Motor and sensory exams are intact  Pulses are palpable  MUSCULOSKELETAL EXAMINATION:  Extremities: The left knee exam demonstrates good range of motion but with some mild pain during end range flexion  He does have medial joint line tenderness  The lateral joint line is nontender    Radha's test and Apley's compression/distraction tests are negative  Thessaly test is positive  Collateral and cruciate ligaments are stable  There is a minimal, if any, effusion noted  Patellofemoral symptoms are absent  There is no erythema or rubor  The remainder of the lower extremity examination, bilaterally, is benign  _____________________________________________________  STUDIES REVIEWED:  X-rays of his knee from May of 2019 demonstrated no significant degenerative disease or acute process  There is an effusion noted  The MRI demonstrates evidence of a meniscal tear medially as well as some minimal degenerative change  The reports were reviewed        Marisa Oh

## 2019-08-14 NOTE — PROGRESS NOTES
ASSESSMENT/PLAN:    Diagnoses and all orders for this visit:    Chronic pain of left knee    Other tear of medial meniscus of left knee as current injury, subsequent encounter    Plan:  I discussed the risks, benefits, options and alternatives of treatment  Having failed conservative measures, I would recommend arthroscopic surgery  He is agreeable but would like to wait until mid September to proceed with surgery  I do not believe he needs any preoperative testing  He will be seen 1 week postoperatively for his 1st follow-up visit  He was instructed to contact me if questions or concerns were to arise prior to surgery  _____________________________________________________  CHIEF COMPLAINT:  Chief Complaint   Patient presents with    Left Knee - Pain, popping    Knee Pain     pt states he injured knee by twisting and then kneeling down, notice the popping and had diffiuclty strengthing and moving         SUBJECTIVE:  Bre Carrasco is a 38y o  year old male who presents for evaluation of his left knee  He has been having symptoms for approximately 3 months, describing 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   He has failed to respond to physical therapy, over-the-counter analgesics and aspiration injection  An MRI was recently obtained and he now has been referred for orthopedic surgical evaluation and treatment  He complains of medial-sided knee pain  He does note improvement in symptoms, but continues to experience pain on a consistent basis  Pain is primarily triggered by twisting activities  He denies pain at rest   He denies any significant morning stiffness  He does note that when his pain is most significant, he feels as if his knee may give out on him      PAST MEDICAL HISTORY:  Past Medical History:   Diagnosis Date    Knee pain, left        PAST SURGICAL HISTORY:  Past Surgical History:   Procedure Laterality Date    ORCHIECTOMY Right FAMILY HISTORY:  Family History   Problem Relation Age of Onset    Diabetes Mother     No Known Problems Father     No Known Problems Sister     No Known Problems Brother        SOCIAL HISTORY:  Social History     Tobacco Use    Smoking status: Former Smoker    Smokeless tobacco: Never Used   Substance Use Topics    Alcohol use: Not Currently     Frequency: Never    Drug use: Never       MEDICATIONS:    Current Outpatient Medications:     clotrimazole-betamethasone (LOTRISONE) 1-0 05 % cream, Apply topically 2 (two) times a day, Disp: , Rfl:     ALLERGIES:  No Known Allergies    Review of systems:   Constitutional: Negative for fatigue, fever or loss of apetite  HENT: Negative  Respiratory: Negative for shortness of breath, dyspnea  Cardiovascular: Negative for chest pain/tightness  Gastrointestinal: Negative for abdominal pain, N/V  Endocrine: Negative for cold/heat intolerance, unexplained weight loss/gain  Genitourinary: Negative for flank pain, dysuria, hematuria  Musculoskeletal:  Positive as in the HPI   Skin: Negative for rash  Neurological:  Negative  Psychiatric/Behavioral: Negative for agitation  _____________________________________________________  PHYSICAL EXAMINATION:    Blood pressure 117/78, pulse 92, height 6' 2" (1 88 m), weight 94 3 kg (208 lb)  General: well developed and well nourished, alert, oriented times 3 and appears comfortable  HEENT: Benign, normocephalic, atraumatic  Cardiovascular:  Regular    Pulmonary: No wheezing or stridor  Abdomen: Soft, Nontender  Skin:  No lacerations or abrasions  Neurovascular: Motor and sensory exams are intact  Pulses are palpable  MUSCULOSKELETAL EXAMINATION:  Extremities: The left knee exam demonstrates good range of motion but with some mild pain during end range flexion  He does have medial joint line tenderness  The lateral joint line is nontender    Radha's test and Apley's compression/distraction tests are negative  Thessaly test is positive  Collateral and cruciate ligaments are stable  There is a minimal, if any, effusion noted  Patellofemoral symptoms are absent  There is no erythema or rubor  The remainder of the lower extremity examination, bilaterally, is benign  _____________________________________________________  STUDIES REVIEWED:  X-rays of his knee from May of 2019 demonstrated no significant degenerative disease or acute process  There is an effusion noted  The MRI demonstrates evidence of a meniscal tear medially as well as some minimal degenerative change  The reports were reviewed        Juan Salas

## 2019-09-06 ENCOUNTER — ANESTHESIA EVENT (OUTPATIENT)
Dept: PERIOP | Facility: HOSPITAL | Age: 44
End: 2019-09-06
Payer: COMMERCIAL

## 2019-09-06 NOTE — ANESTHESIA PREPROCEDURE EVALUATION
Review of Systems/Medical History  Patient summary reviewed  Chart reviewed      Cardiovascular   Pulmonary  Not a smoker ,        GI/Hepatic    No GERD ,             Endo/Other     GYN       Hematology   Musculoskeletal       Neurology   Psychology         Lab Results   Component Value Date    WBC 6 30 02/15/2019    HGB 15 8 02/15/2019    HCT 47 5 (H) 02/15/2019    MCV 88 02/15/2019     02/15/2019     Lab Results   Component Value Date    CALCIUM 9 0 02/15/2019    K 3 8 02/15/2019    CO2 29 02/15/2019     02/15/2019    BUN 14 02/15/2019    CREATININE 1 07 02/15/2019     Lab Results   Component Value Date    INR 0 96 02/15/2019    PROTIME 11 1 02/15/2019     Lab Results   Component Value Date    PTT 53 (H) 02/15/2019     Physical Exam    Airway    Mallampati score: IV  TM Distance: >3 FB  Neck ROM: full     Dental   No notable dental hx     Cardiovascular  Cardiovascular exam normal    Pulmonary  Pulmonary exam normal     Other Findings        Anesthesia Plan  ASA Score- 2     Anesthesia Type- general with ASA Monitors  Additional Monitors:   Airway Plan: LMA  Comment: I personally discussed risks and benefits to this anesthetic  All patient questions were answered  Pt agrees with anesthesia plan        Plan Factors-    Induction- intravenous  Postoperative Plan- Plan for postoperative opioid use  Planned trial extubation    Informed Consent- Anesthetic plan and risks discussed with patient  I personally reviewed this patient with the CRNA  Discussed and agreed on the Anesthesia Plan with the CRNA  Miki Lott

## 2019-09-11 NOTE — PRE-PROCEDURE INSTRUCTIONS
No outpatient medications have been marked as taking for the 9/12/19 encounter [facility] HOSPITAL Encounter)  My Surgical Experience    The following information was developed to assist you to prepare for your operation  What do I need to do before coming to the hospital?   Arrange for a responsible person to drive you to and from the hospital    Arrange care for your children at home  Children are not allowed in the recovery areas of the hospital   Plan to wear clothing that is easy to put on and take off  If you are having shoulder surgery, wear a shirt that buttons or zippers in the front  Bathing  o Shower the evening before and the morning of your surgery with an antibacterial soap  Please refer to the Pre Op Showering Instructions for Surgery Patients Sheet   o Remove nail polish and all body piercing jewelry  o Do not shave any body part for at least 24 hours before surgery-this includes face, arms, legs and upper body  Food  o Nothing to eat or drink after midnight the night before your surgery  This includes candy and chewing gum  o Exception: If your surgery is after 12:00pm (noon), you may have clear liquids such as 7-Up®, ginger ale, apple or cranberry juice, Jell-O®, water, or clear broth until 8:00 am  o Do not drink milk or juice with pulp on the morning before surgery  o Do not drink alcohol 24 hours before surgery  Medicine  o Follow instructions you received from your surgeon about which medicines you may take on the day of surgery  o If instructed to take medicine on the morning of surgery, take pills with just a small sip of water  Call your prescribing doctor for specific infroamtion on what to do if you take insulin    What should I bring to the hospital?    Bring:  Adri Terry or a walker, if you have them, for foot or knee surgery   A list of the daily medicines, vitamins, minerals, herbals and nutritional supplements you take   Include the dosages of medicines and the time you take them each day   Glasses, dentures or hearing aids   Minimal clothing; you will be wearing hospital sleepwear   Photo ID; required to verify your identity   If you have a Living Will or Power of , bring a copy of the documents   If you have an ostomy, bring an extra pouch and any supplies you use    Do not bring   Medicines or inhalers   Money, valuables or jewelry    What other information should I know about the day of surgery?  Notify your surgeons if you develop a cold, sore throat, cough, fever, rash or any other illness   Report to the Ambulatory Surgical/Same Day Surgery Unit   You will be instructed to stop at Registration only if you have not been pre-registered   Inform your  fi they do not stay that they will be asked by the staff to leave a phone number where they can be reached   Be available to be reached before surgery  In the event the operating room schedule changes, you may be asked to come in earlier or later than expected    *It is important to tell your doctor and others involved in your health care if you are taking or have been taking any non-prescription drugs, vitamins, minerals, herbals or other nutritional supplements   Any of these may interact with some food or medicines and cause a reaction

## 2019-09-12 ENCOUNTER — HOSPITAL ENCOUNTER (OUTPATIENT)
Facility: HOSPITAL | Age: 44
Setting detail: OUTPATIENT SURGERY
Discharge: HOME | End: 2019-09-12
Attending: ORTHOPAEDIC SURGERY | Admitting: ORTHOPAEDIC SURGERY
Payer: COMMERCIAL

## 2019-09-12 ENCOUNTER — ANESTHESIA (OUTPATIENT)
Dept: PERIOP | Facility: HOSPITAL | Age: 44
End: 2019-09-12
Payer: COMMERCIAL

## 2019-09-12 VITALS
OXYGEN SATURATION: 99 % | HEART RATE: 73 BPM | BODY MASS INDEX: 26.69 KG/M2 | SYSTOLIC BLOOD PRESSURE: 119 MMHG | DIASTOLIC BLOOD PRESSURE: 73 MMHG | HEIGHT: 74 IN | RESPIRATION RATE: 16 BRPM | WEIGHT: 208 LBS | TEMPERATURE: 97.3 F

## 2019-09-12 DIAGNOSIS — S83.242D OTHER TEAR OF MEDIAL MENISCUS OF LEFT KNEE AS CURRENT INJURY, SUBSEQUENT ENCOUNTER: Primary | ICD-10-CM

## 2019-09-12 PROCEDURE — 29881 ARTHRS KNE SRG MNISECTMY M/L: CPT | Performed by: ORTHOPAEDIC SURGERY

## 2019-09-12 PROCEDURE — 29881 ARTHRS KNE SRG MNISECTMY M/L: CPT | Performed by: PHYSICIAN ASSISTANT

## 2019-09-12 RX ORDER — ONDANSETRON 2 MG/ML
4 INJECTION INTRAMUSCULAR; INTRAVENOUS ONCE AS NEEDED
Status: CANCELLED | OUTPATIENT
Start: 2019-09-12

## 2019-09-12 RX ORDER — KETOROLAC TROMETHAMINE 30 MG/ML
INJECTION, SOLUTION INTRAMUSCULAR; INTRAVENOUS AS NEEDED
Status: DISCONTINUED | OUTPATIENT
Start: 2019-09-12 | End: 2019-09-12 | Stop reason: SURG

## 2019-09-12 RX ORDER — OXYCODONE HYDROCHLORIDE AND ACETAMINOPHEN 5; 325 MG/1; MG/1
1 TABLET ORAL EVERY 6 HOURS PRN
Qty: 20 TABLET | Refills: 0 | Status: SHIPPED | OUTPATIENT
Start: 2019-09-12 | End: 2019-09-19 | Stop reason: ALTCHOICE

## 2019-09-12 RX ORDER — OXYCODONE HYDROCHLORIDE AND ACETAMINOPHEN 5; 325 MG/1; MG/1
1 TABLET ORAL EVERY 4 HOURS PRN
Status: DISCONTINUED | OUTPATIENT
Start: 2019-09-12 | End: 2019-09-12 | Stop reason: HOSPADM

## 2019-09-12 RX ORDER — GINSENG 100 MG
CAPSULE ORAL AS NEEDED
Status: DISCONTINUED | OUTPATIENT
Start: 2019-09-12 | End: 2019-09-12 | Stop reason: HOSPADM

## 2019-09-12 RX ORDER — BUPIVACAINE HYDROCHLORIDE 2.5 MG/ML
INJECTION, SOLUTION INFILTRATION; PERINEURAL AS NEEDED
Status: DISCONTINUED | OUTPATIENT
Start: 2019-09-12 | End: 2019-09-12 | Stop reason: HOSPADM

## 2019-09-12 RX ORDER — FENTANYL CITRATE 50 UG/ML
INJECTION, SOLUTION INTRAMUSCULAR; INTRAVENOUS AS NEEDED
Status: DISCONTINUED | OUTPATIENT
Start: 2019-09-12 | End: 2019-09-12 | Stop reason: SURG

## 2019-09-12 RX ORDER — SODIUM CHLORIDE, SODIUM LACTATE, POTASSIUM CHLORIDE, CALCIUM CHLORIDE 600; 310; 30; 20 MG/100ML; MG/100ML; MG/100ML; MG/100ML
125 INJECTION, SOLUTION INTRAVENOUS CONTINUOUS
Status: DISCONTINUED | OUTPATIENT
Start: 2019-09-12 | End: 2019-09-12 | Stop reason: HOSPADM

## 2019-09-12 RX ORDER — FENTANYL CITRATE/PF 50 MCG/ML
25 SYRINGE (ML) INJECTION
Status: DISCONTINUED | OUTPATIENT
Start: 2019-09-12 | End: 2019-09-12 | Stop reason: HOSPADM

## 2019-09-12 RX ORDER — PROPOFOL 10 MG/ML
INJECTION, EMULSION INTRAVENOUS AS NEEDED
Status: DISCONTINUED | OUTPATIENT
Start: 2019-09-12 | End: 2019-09-12 | Stop reason: SURG

## 2019-09-12 RX ORDER — MEPERIDINE HYDROCHLORIDE 50 MG/ML
12.5 INJECTION INTRAMUSCULAR; INTRAVENOUS; SUBCUTANEOUS
Status: CANCELLED | OUTPATIENT
Start: 2019-09-12

## 2019-09-12 RX ORDER — LIDOCAINE HYDROCHLORIDE 10 MG/ML
INJECTION, SOLUTION INFILTRATION; PERINEURAL AS NEEDED
Status: DISCONTINUED | OUTPATIENT
Start: 2019-09-12 | End: 2019-09-12 | Stop reason: SURG

## 2019-09-12 RX ORDER — SODIUM CHLORIDE, SODIUM LACTATE, POTASSIUM CHLORIDE, CALCIUM CHLORIDE 600; 310; 30; 20 MG/100ML; MG/100ML; MG/100ML; MG/100ML
100 INJECTION, SOLUTION INTRAVENOUS CONTINUOUS
Status: CANCELLED | OUTPATIENT
Start: 2019-09-12

## 2019-09-12 RX ORDER — HYDROMORPHONE HCL/PF 1 MG/ML
0.5 SYRINGE (ML) INJECTION
Status: CANCELLED | OUTPATIENT
Start: 2019-09-12

## 2019-09-12 RX ORDER — CEFAZOLIN SODIUM 2 G/50ML
2000 SOLUTION INTRAVENOUS ONCE
Status: COMPLETED | OUTPATIENT
Start: 2019-09-12 | End: 2019-09-12

## 2019-09-12 RX ORDER — KETOROLAC TROMETHAMINE 30 MG/ML
30 INJECTION, SOLUTION INTRAMUSCULAR; INTRAVENOUS ONCE AS NEEDED
Status: CANCELLED | OUTPATIENT
Start: 2019-09-12 | End: 2019-09-13

## 2019-09-12 RX ORDER — FENTANYL CITRATE/PF 50 MCG/ML
50 SYRINGE (ML) INJECTION 2 TIMES DAILY PRN
Status: CANCELLED | OUTPATIENT
Start: 2019-09-12

## 2019-09-12 RX ADMIN — CEFAZOLIN SODIUM 2000 MG: 2 SOLUTION INTRAVENOUS at 07:26

## 2019-09-12 RX ADMIN — PROPOFOL 100 MG: 10 INJECTION, EMULSION INTRAVENOUS at 07:36

## 2019-09-12 RX ADMIN — FENTANYL CITRATE 25 MCG: 50 INJECTION INTRAMUSCULAR; INTRAVENOUS at 08:52

## 2019-09-12 RX ADMIN — FENTANYL CITRATE 50 MCG: 50 INJECTION INTRAMUSCULAR; INTRAVENOUS at 07:33

## 2019-09-12 RX ADMIN — KETOROLAC TROMETHAMINE 30 MG: 30 INJECTION, SOLUTION INTRAMUSCULAR at 08:09

## 2019-09-12 RX ADMIN — LIDOCAINE HYDROCHLORIDE 100 MG: 10 INJECTION, SOLUTION INFILTRATION; PERINEURAL at 07:33

## 2019-09-12 RX ADMIN — PROPOFOL 100 MG: 10 INJECTION, EMULSION INTRAVENOUS at 07:35

## 2019-09-12 RX ADMIN — PROPOFOL 200 MG: 10 INJECTION, EMULSION INTRAVENOUS at 07:33

## 2019-09-12 RX ADMIN — SODIUM CHLORIDE, POTASSIUM CHLORIDE, SODIUM LACTATE AND CALCIUM CHLORIDE 125 ML/HR: 600; 310; 30; 20 INJECTION, SOLUTION INTRAVENOUS at 06:22

## 2019-09-12 RX ADMIN — SODIUM CHLORIDE, POTASSIUM CHLORIDE, SODIUM LACTATE AND CALCIUM CHLORIDE: 600; 310; 30; 20 INJECTION, SOLUTION INTRAVENOUS at 07:26

## 2019-09-12 NOTE — DISCHARGE INSTRUCTIONS
Hydrocodone/Acetaminophen (By mouth)   Acetaminophen (j-wtxs-h-MIN-oh-fen), Hydrocodone Bitartrate (znq-niiy-AWZ-done bye-TAR-trate)  Treats pain  This medicine contains a narcotic pain reliever  Brand Name(s): Hycet, Lorcet, Lorcet HD, Lorcet Plus, Lortab 10/325, Lortab 5/325, Lortab 7 5/325, Lortab Elixir, Norco, Verdrocet, Vicodin, Vicodin ES, Vicodin HP, Xodol, Xodol 5/300   There may be other brand names for this medicine  When This Medicine Should Not Be Used: This medicine is not right for everyone  Do not use it if you had an allergic reaction to acetaminophen, hydrocodone, or other narcotic medicines, or stomach or bowel blockage (including paralytic ileus)  How to Use This Medicine:   Capsule, Liquid, Tablet  · Your doctor will tell you how much medicine to use  Do not use more than directed  · An overdose can be dangerous  Follow directions carefully so you do not get too much medicine at one time  · Oral liquid: Measure the oral liquid medicine with a marked measuring spoon, oral syringe, or medicine cup  · Drink plenty of liquids to help avoid constipation  · This medicine should come with a Medication Guide  Ask your pharmacist for a copy if you do not have one  · Missed dose: Take a dose as soon as you remember  If it is almost time for your next dose, wait until then and take a regular dose  Do not take extra medicine to make up for a missed dose  · Store the medicine in a closed container at room temperature, away from heat, moisture, and direct light  Flush any unused Norco® tablets down the toilet  Drugs and Foods to Avoid:   Ask your doctor or pharmacist before using any other medicine, including over-the-counter medicines, vitamins, and herbal products  · Do not use this medicine if you are using or have used an MAO inhibitor within the past 14 days  · Some medicines can affect how hydrocodone/acetaminophen works   Tell your doctor if you are using any of the following: ¨ Carbamazepine, erythromycin, ketoconazole, mirtazapine, phenytoin, rifampin, ritonavir, tramadol, trazodone  ¨ Diuretic (water pill)  ¨ Medicine to treat depression or mental health problems  ¨ Medicine to treat migraine headaches  ¨ Phenothiazine medicine  · Tell your doctor if you use anything else that makes you sleepy  Some examples are allergy medicine, narcotic pain medicine, and alcohol  Tell your doctor if you are using buprenorphine, butorphanol, nalbuphine, pentazocine, or a muscle relaxer  · Do not drink alcohol while you are using this medicine  Acetaminophen can damage your liver, and your risk is higher if you also drink alcohol  Warnings While Using This Medicine:   · Tell your doctor if you are pregnant or breastfeeding, or if you have kidney disease, liver disease, lung or breathing problems, gallbladder or pancreas problems, an underactive thyroid, [place] disease, prostate problems, trouble urinating, stomach problems, or a history of head injury or brain tumor, seizures, alcohol or drug addiction  · This medicine may cause the following problems:   ¨ High risk of overdose, which can lead to death  ¨ Respiratory depression (serious breathing problem that can be life-threatening)  ¨ Liver problems  ¨ Serious skin reactions  ¨ Serotonin syndrome (when used with certain medicines)  · This medicine can be habit-forming  Do not use more than your prescribed dose  Call your doctor if you think your medicine is not working  · This medicine may make you dizzy or drowsy  Do not drive or doing anything else that could be dangerous until you know how this medicine affects you  · This medicine contains acetaminophen  Read the labels of all other medicines you are using to see if they also contain acetaminophen, or ask your doctor or pharmacist  Tom Blackmon not use more than 4 grams (4,000 milligrams) total of acetaminophen in one day    · Tell any doctor or dentist who treats you that you are using this medicine  This medicine may affect certain medical test results  · This medicine may cause constipation, especially with long-term use  Ask your doctor if you should use a laxative to prevent and treat constipation  · This medicine could cause infertility  Talk with your doctor before using this medicine if you plan to have children  · Keep all medicine out of the reach of children  Never share your medicine with anyone  Possible Side Effects While Using This Medicine:   Call your doctor right away if you notice any of these side effects:  · Allergic reaction: Itching or hives, swelling in your face or hands, swelling or tingling in your mouth or throat, chest tightness, trouble breathing  · Anxiety, restlessness, fast heartbeat, fever, sweating, muscle spasms, twitching, diarrhea, seeing or hearing things that are not there  · Blistering, peeling, red skin rash  · Blue lips, fingernails, or skin  · Dark urine or pale stools, loss of appetite, nausea or vomiting, stomach pain, yellow skin or eyes  · Extreme weakness, shallow breathing, slow heartbeat, sweating, seizures, cold or clammy skin  · Lightheadedness, dizziness, fainting  If you notice these less serious side effects, talk with your doctor:   · Constipation, nausea, vomiting  · Tiredness or sleepiness  If you notice other side effects that you think are caused by this medicine, tell your doctor  Call your doctor for medical advice about side effects  You may report side effects to FDA at 1-847-FDA-2393  © 2017 [address] [employer] Information is for End User's use only and may not be sold, redistributed or otherwise used for commercial purposes  The above information is an  only  It is not intended as medical advice for individual conditions or treatments  Talk to your doctor, nurse or pharmacist before following any medical regimen to see if it is safe and effective for you    Knee Arthroscopy   WHAT YOU NEED TO KNOW:   What do I need to know about a knee arthroscopy? A knee arthroscopy is a procedure to look inside your knee joint with an arthroscope  An arthroscope is a flexible tube with a light and camera on the end  A knee arthroscopy is usually done to check for disease or damage inside your knee  These problems may be fixed during the procedure  How do I prepare for a knee arthroscopy? · You may need an x-ray, ultrasound, or MRI before your procedure  These tests will take pictures of your joint and help your healthcare provider plan for your surgery  Arrange for someone to drive you home and stay with you for at least 24 hours after the procedure  · Your healthcare provider will talk to you about how to prepare for your procedure  He may tell you not to eat or drink anything after midnight on the day of your procedure  He will tell you what medicines to take or not take on the day of your procedure  You may be given an antibiotic through your IV to help prevent a bacterial infection  What will happen during a knee arthroscopy? · You may be given general anesthesia to keep you asleep and free from pain during surgery  You may instead be given spinal anesthesia to numb the surgery area  With spinal anesthesia, you may still feel pressure or pushing during surgery, but you should not feel any pain  · Your healthcare provider will make a small incision over your knee and insert the arthroscope  He may make 3 to 4 other small incisions in different places over your knee  Fluid will be injected into your knee to help your healthcare provider see things more clearly on the camera  Tools may be inserted through the incisions to fix problems in your knee  The incisions may be closed with stitches or strips of medical tape and covered with a bandage  What will happen after a knee arthroscopy? Healthcare providers will monitor you until you are awake   You may need an x-ray to look at your knee joint and monitor for complications  Do not get out of bed until your healthcare provider says it is okay  Do not put weight or pressure on your leg that was operated on  You may be able to go home when your pain is controlled or you may need to spend a night in the hospital    What are the risks of a knee arthroscopy? You may bleed more than expected or get an infection  You may have an allergic reaction to the anesthesia  You may have pain or knee stiffness  You may have swelling under your skin that prevents blood flow to the rest of your leg  You may need surgery to fix this problem  You may get a blood clot in your leg or arm  The clot may travel to your heart or brain and cause life-threatening problems, such as a heart attack or stroke  CARE AGREEMENT:   You have the right to help plan your care  Learn about your health condition and how it may be treated  Discuss treatment options with your caregivers to decide what care you want to receive  You always have the right to refuse treatment  The above information is an  only  It is not intended as medical advice for individual conditions or treatments  Talk to your doctor, nurse or pharmacist before following any medical regimen to see if it is safe and effective for you  © 2017 [address] [facility] Information is for End User's use only and may not be sold, redistributed or otherwise used for commercial purposes  All illustrations and images included in CareNotes® are the copyrighted property of [employer]  or Vernon [employer]h  Discharge Instructions - Orthopedics  Katerine Pereira 38 y o  male MRN: [id number]  Unit/Bed#: OR MAIN    Weight Bearing Status:                                           Weight-bearing as tolerated to the left lower extremity    Pain:  Continue analgesics as directed  You pain medications were sent to the pharmacy on record  Dressing Instructions: You may remove dressing in 3 days (72 hours)   After dressing removal, you may shower and let water run over incisions  Do NOT submerge incisions for any length of time  Appt Instructions: If you do not have your appointment, please call the clinic at [phone] to f/u with Dr Yaritza Valadez in 1 week  Otherwise followup as scheduled below:  09/19/2019    Contact the office sooner if you experience any increased numbness/tingling in the extremities

## 2019-09-12 NOTE — OP NOTE
OPERATIVE REPORT  PATIENT NAME: Leopoldo Hodge    :  1975  MRN: [id number]  Pt Location: [address] [place] [address] OR ROOM 03    SURGERY DATE: 2019    Surgeon(s) and Role:     * Denice Ying - Primary     * Rosa De Guzman PA-C - Assisting            The physician assistant was required to help with holding the limb, assisting with utilization of arthroscopic instruments  Residents were not available  Preop Diagnosis:  Other tear of medial meniscus of left knee as current injury, subsequent encounter [S83 242D]    Post-Op Diagnosis Codes:     * Other tear of medial meniscus of left knee as current injury, subsequent encounter [S83 242D], patellofemoral synovitis and grade 2 chondritis medial femoral condyle    Procedure(s) (LRB):  KNEE ARTHROSCOPY- medial meniscectomy, medial patellar synovectomy,  medial femoral chondroplasty (Left)    Specimen(s):  * No specimens in log *    Estimated Blood Loss:   Minimal    Drains:  * No LDAs found *    Anesthesia Type:   General with supplemental local utilizing 20 cc of 0 25% Marcaine with epinephrine    Operative Indications: Other tear of medial meniscus of left knee as current injury, subsequent encounter Fritz Lefort is a 34-year-old male with left knee pain  MRI was obtained demonstrating findings consistent with a medial meniscal tear  The risks, benefits, options and alternatives of treatment were discussed it was elected to proceed with arthroscopic surgery  Operative Findings: At the time of the surgery, the patient was noted to have a tear of the medial meniscus which had flipped from its posterior location and was sitting on top of the middle portion of the medial meniscus  There were grade 1-2 changes noted in the medial femoral condyle  The lateral meniscus and lateral compartment were without injury  The cruciate ligaments were visible and intact    There was a significant degree of synovium which was between the patella and trochlear notch being pinched during range of motion  At the conclusion of the procedure, a stable rim of posterior medial meniscus remained and the synovial fold getting trapped patellofemoral joint had been removed  Complications:   None    Procedure and Technique:  Marco Gamino was taken to the operating room where he was administered a general anesthetic  A well-padded tourniquet was applied to the proximal left thigh in the arthroscopic leg teixeira applied just distal to the tourniquet cuff  The left lower extremity was prepped and draped in standard fashion  A preoperative time-out was performed and preoperative antibiotics had been administered  The anterolateral portal was accessed and the arthroscope inserted  Diagnostic arthroscopy was performed  Initially upon entering into the joint, there was a mild degree of bloody effusion noted  After irrigation, visualization notably improved  Diagnostic arthroscopy was completed and the findings are as indicated above  The anteromedial portal was then accessed and a an arthroscopic punch was utilized to transect the medial meniscus at the origin of its tear where it was still attached to the posterior portion of the central horn of the medial meniscus  Once this was accomplished, an arthroscopic grasper was utilized and removed the meniscal fragment  An arthroscopic shaver was then utilized to trim the edge of the medial meniscus from its posterior attachment to the posterior portion of the central horn  Chondroplasty of the medial femoral condyle was performed  Synovectomy at the patellofemoral joint was then performed accomplishing removal of the synovial tissue that was impinged at the patellofemoral joint  The knee was then inspected, no further pathology was noted and the knee was thoroughly irrigated with saline solution  The inflow was discontinued in the knee suctioned dry as possible    Instrumentation was removed and the 20 cc of 0 25% Marcaine with epinephrine was injected  The portals were closed with 4 0 nylon and dressings then applied consisting of bacitracin, Adaptic, gauze, Webril and an Ace bandage  The patient was awakened from the general anesthetic and transferred to the recovery room in stable and satisfactory postoperative condition     I was present for the entire procedure    Patient Disposition:  PACU     SIGNATURE: Michael Rangel  DATE: September 12, 2019  TIME: 8:08 AM

## 2019-09-12 NOTE — INTERVAL H&P NOTE
H&P reviewed  After examining the patient I find no changes in the patients condition since the H&P had been written      Vitals:    09/12/19 0613   BP: 111/74   Pulse: 67   Resp: 16   Temp: (!) 97 4 °F (36 3 °C)   SpO2: 95%

## 2019-09-12 NOTE — ANESTHESIA POSTPROCEDURE EVALUATION
Post-Op Assessment Note    CV Status:  Stable       Mental Status:  Alert and somnolent   Hydration Status:  Stable   PONV Controlled:  Controlled   Airway Patency:  Patent   Post Op Vitals Reviewed: Yes      Staff: CRNA           BP   132/76   Temp  97 4   Pulse  80   Resp   16   SpO2   98

## 2019-09-16 ENCOUNTER — TELEPHONE (OUTPATIENT)
Dept: OBGYN CLINIC | Facility: HOSPITAL | Age: 44
End: 2019-09-16

## 2019-09-16 NOTE — TELEPHONE ENCOUNTER
Patient's wife called in  # [phone]    Patients wife Shana Davis wants to know once the patient take off the dressing to shower as of yesterday, should it be exposed or covered back up  Please advise

## 2019-09-16 NOTE — TELEPHONE ENCOUNTER
Patient's wife guadalupe given discharge instructions patient is okay to remove the dressings after 72 hrs and okay to shower but keep out of the direct flow of water  Open to air unless the knee is having drainage  Guadalupe verbalized understanding

## 2019-09-19 ENCOUNTER — OFFICE VISIT (OUTPATIENT)
Dept: OBGYN CLINIC | Facility: CLINIC | Age: 44
End: 2019-09-19

## 2019-09-19 VITALS
BODY MASS INDEX: 26.56 KG/M2 | HEART RATE: 94 BPM | WEIGHT: 207 LBS | HEIGHT: 74 IN | DIASTOLIC BLOOD PRESSURE: 82 MMHG | SYSTOLIC BLOOD PRESSURE: 116 MMHG

## 2019-09-19 DIAGNOSIS — Z98.890 S/P ARTHROSCOPY OF LEFT KNEE: Primary | ICD-10-CM

## 2019-09-19 DIAGNOSIS — S83.242D OTHER TEAR OF MEDIAL MENISCUS OF LEFT KNEE AS CURRENT INJURY, SUBSEQUENT ENCOUNTER: ICD-10-CM

## 2019-09-19 PROCEDURE — 99024 POSTOP FOLLOW-UP VISIT: CPT | Performed by: ORTHOPAEDIC SURGERY

## 2019-09-19 NOTE — PROGRESS NOTES
Patient Name:  Leopoldo Hodge  MRN:  [id number]    Assessment     1  S/P arthroscopy of left knee     2  Other tear of medial meniscus of left knee as current injury, subsequent encounter         Plan     Surgical photographs were reviewed with the patient  He may continue working but was advised that it may cause increased pain and swelling  If this does occur, he should decrease the amount that he's working  He may use OTC analgesics and ice prn pain  He may continue the ACE wrap if he wishes  We will see the patient back in 2 weeks for repeat evaluation  He was encouraged to contact the office if he should have any questions or concerns  Return in about 2 weeks (around 10/3/2019)  Subjective   Leopoldo Hodge returns for follow-up of left knee  The patient is 1 week(s) post-SARK and returns for routine follow-up  Patient reports he is doing well  He is not currently taking anything for pain  He states that he return to work yesterday  He is self-employed and works as a  he has not noticed any increased pain or swelling since returning to work  Objective     /82   Pulse 94   Ht 6' 2" (1 88 m)   Wt 93 9 kg (207 lb)   BMI 26 58 kg/m²     Left knee:   Incisions are benign, no erythema, drainage  Mild generalized swelling  No tenderness to palpation  Patient has full active and passive ROM without pain  Motor and sensory exams are grossly intact, distal pulses palpable  Limb is warm and well perfused with good color and capillary review  Data Review     No new studies to review             Rosa De Guzman PA-C

## 2019-10-03 ENCOUNTER — OFFICE VISIT (OUTPATIENT)
Dept: OBGYN CLINIC | Facility: CLINIC | Age: 44
End: 2019-10-03

## 2019-10-03 VITALS
HEIGHT: 74 IN | WEIGHT: 207 LBS | SYSTOLIC BLOOD PRESSURE: 117 MMHG | HEART RATE: 97 BPM | DIASTOLIC BLOOD PRESSURE: 77 MMHG | BODY MASS INDEX: 26.56 KG/M2

## 2019-10-03 DIAGNOSIS — S83.242D OTHER TEAR OF MEDIAL MENISCUS OF LEFT KNEE AS CURRENT INJURY, SUBSEQUENT ENCOUNTER: Primary | ICD-10-CM

## 2019-10-03 PROCEDURE — 99024 POSTOP FOLLOW-UP VISIT: CPT | Performed by: ORTHOPAEDIC SURGERY

## 2019-10-03 NOTE — PROGRESS NOTES
Patient Name:  Paris Garcia  MRN:  [id number]    Assessment     1  Other tear of medial meniscus of left knee as current injury, subsequent encounter         Plan     1  I recommended follow-up as needed  He is doing quite well and has essentially resumed his normal activity level  He does still occasionally experience some stiffness in his knees but he was reassured that this is not uncommon and should resolve within 3-6 months of surgery  Return if symptoms worsen or fail to improve  Subjective   Paris Garcia returns for follow-up of his left knee  The patient is 3 week(s) post arthroscopy and returns for routine follow-up  Patient Reports excellent progress  He was working today, climbing up onto a high bumper of a vehicle and this seem to aggravate his knee somewhat  He does note some stiffness after sitting for a while or after climbing stairs  Objective     /77   Pulse 97   Ht 6' 2" (1 88 m)   Wt 93 9 kg (207 lb)   BMI 26 58 kg/m²     For left knee exam demonstrates excellent range of motion  Incisions are well healed  He does have some mild tenderness over the medial incision  Also slight tenderness over the anteromedial joint line  The lateral side is nontender  Collateral ligaments are stable  He ambulated without difficulty          Jd Tinsley

## 2022-08-08 ENCOUNTER — HOSPITAL ENCOUNTER (OUTPATIENT)
Dept: RADIOLOGY | Facility: HOSPITAL | Age: 47
Discharge: HOME | End: 2022-08-08
Attending: CHIROPRACTOR
Payer: COMMERCIAL

## 2022-08-08 DIAGNOSIS — M54.50 LOW BACK PAIN, UNSPECIFIED BACK PAIN LATERALITY, UNSPECIFIED CHRONICITY, UNSPECIFIED WHETHER SCIATICA PRESENT: ICD-10-CM

## 2022-08-08 PROCEDURE — 72110 X-RAY EXAM L-2 SPINE 4/>VWS: CPT

## 2023-08-22 ENCOUNTER — OFFICE VISIT (OUTPATIENT)
Dept: URGENT CARE | Facility: CLINIC | Age: 48
End: 2023-08-22
Payer: COMMERCIAL

## 2023-08-22 VITALS
TEMPERATURE: 97.9 F | WEIGHT: 200.5 LBS | OXYGEN SATURATION: 98 % | HEART RATE: 72 BPM | DIASTOLIC BLOOD PRESSURE: 70 MMHG | RESPIRATION RATE: 16 BRPM | SYSTOLIC BLOOD PRESSURE: 120 MMHG | BODY MASS INDEX: 25.74 KG/M2

## 2023-08-22 DIAGNOSIS — L23.7 POISON IVY DERMATITIS: Primary | ICD-10-CM

## 2023-08-22 PROCEDURE — 99213 OFFICE O/P EST LOW 20 MIN: CPT

## 2023-08-22 RX ORDER — PREDNISONE 10 MG/1
TABLET ORAL
Qty: 49 TABLET | Refills: 0 | Status: SHIPPED | OUTPATIENT
Start: 2023-08-22

## 2023-08-22 NOTE — PATIENT INSTRUCTIONS
Start prednisone as prescribed. Take 6 pills x 2 days, 5 pills x 2 days, 4 pills x 3 days, 3 pills x 3 days, 2 pills x 2 days, and 1 pill x 2 days. Allegra and pepcid over the counter  Benadryl as needed at night (do not drive or operate heavy machinery after taking)  Follow up with PCP in 3-5 days.   Proceed to ER if symptoms worsen

## 2023-08-22 NOTE — PROGRESS NOTES
[facility]        NAME: Felicity Hdez is a 51 y.o. male  : 1975    MRN: [id number]  DATE: 2023  TIME: 8:58 AM    Assessment and Plan   Poison ivy dermatitis [L23.7]  1. Poison ivy dermatitis  predniSONE 10 mg tablet            Patient Instructions     Start prednisone as prescribed. Take 6 pills x 2 days, 5 pills x 2 days, 4 pills x 3 days, 3 pills x 3 days, 2 pills x 2 days, and 1 pill x 2 days. Allegra and pepcid over the counter  Benadryl as needed at night (do not drive or operate heavy machinery after taking)Follow up with PCP in 3-5 days. Proceed to  ER if symptoms worsen. Chief Complaint     Chief Complaint   Patient presents with   • Rash     Rash on ankles 1 week ago from exposure to poison ivy. No fever or chills. Denies dyspnea. Using soap with no relief. History of Present Illness       Patient is a 50 yo male with no significant PMH presenting in the clinic today for rash x 1 week. Patient notes he was doing yard work last week while wearing shoes without socks. Admits pruritic, erythematous and oozing rash along b/l ankles and feet. Denies fever, chills, chest pain, and SOB. Admits the use of OTC poison ivy soap for symptom management. Denies household members with a similar rash. Denies recent changes in medications, diet, soaps, and detergents. Review of Systems   Review of Systems   Constitutional: Negative for chills and fever. Respiratory: Negative for shortness of breath. Cardiovascular: Negative for chest pain. Skin: Positive for rash. Negative for wound.          Current Medications       Current Outpatient Medications:   •  predniSONE 10 mg tablet, Take 6 pills x 2 days, 5 pills x 2 days, 4 pills x 3 days, 3 pills x 3 days, 2 pills x 2 days, and 1 pill x 2 days, Disp: 49 tablet, Rfl: 0    Current Allergies     Allergies as of 2023   • (No Known Allergies)            The following portions of the patient's history were reviewed and updated as appropriate: allergies, current medications, past family history, past medical history, past social history, past surgical history and problem list.     Past Medical History:   Diagnosis Date   • Knee pain, left    • Medial meniscus tear     left knee       Past Surgical History:   Procedure Laterality Date   • ORCHIECTOMY Right    • DE KNEE SCOPE,MED/LAT MENISECTOMY Left 9/12/2019    Procedure: KNEE ARTHROSCOPY- medial meniscectomy medial patellar synovectomy  medial femoral chondroplasty;  Surgeon: Raymundo Wahl; Location: [facility] MAIN OR;  Service: Orthopedics       Family History   Problem Relation Age of Onset   • Diabetes Mother    • No Known Problems Father    • No Known Problems Sister    • No Known Problems Brother          Medications have been verified. Objective   /70   Pulse 72   Temp 97.9 °F (36.6 °C)   Resp 16   Wt 90.9 kg (200 lb 8 oz)   SpO2 98%   BMI 25.74 kg/m²        Physical Exam     Physical Exam  Vitals reviewed. Constitutional:       General: He is not in acute distress. Appearance: Normal appearance. He is normal weight. He is not ill-appearing. HENT:      Head: Normocephalic. Nose: Nose normal. No congestion or rhinorrhea. Mouth/Throat:      Mouth: Mucous membranes are moist.   Eyes:      General:         Right eye: No discharge. Left eye: No discharge. Conjunctiva/sclera: Conjunctivae normal.   Cardiovascular:      Rate and Rhythm: Normal rate and regular rhythm. Pulses: Normal pulses. Heart sounds: Normal heart sounds. No friction rub. No gallop. Pulmonary:      Effort: Pulmonary effort is normal.      Breath sounds: Normal breath sounds. No wheezing, rhonchi or rales. Musculoskeletal:      Cervical back: Normal range of motion and neck supple. Skin:     General: Skin is warm. Findings: Rash present.       Comments: Erythematous macular papular rash located along the anterior aspect of b/l ankles and the dorsal aspect of b/l feet. Consistent with poison ivy dermatitis. Neurological:      Mental Status: He is alert.    Psychiatric:         Mood and Affect: Mood normal.         Behavior: Behavior normal.

## 2024-07-04 ENCOUNTER — OFFICE VISIT (OUTPATIENT)
Dept: URGENT CARE | Facility: CLINIC | Age: 49
End: 2024-07-04
Payer: COMMERCIAL

## 2024-07-04 VITALS
HEIGHT: 75 IN | SYSTOLIC BLOOD PRESSURE: 118 MMHG | OXYGEN SATURATION: 99 % | HEART RATE: 99 BPM | WEIGHT: 206 LBS | RESPIRATION RATE: 20 BRPM | DIASTOLIC BLOOD PRESSURE: 73 MMHG | BODY MASS INDEX: 25.61 KG/M2 | TEMPERATURE: 97.7 F

## 2024-07-04 DIAGNOSIS — L30.4 INTERTRIGO: Primary | ICD-10-CM

## 2024-07-04 DIAGNOSIS — I88.9 ADENITIS: ICD-10-CM

## 2024-07-04 PROCEDURE — S9088 SERVICES PROVIDED IN URGENT: HCPCS | Performed by: PHYSICIAN ASSISTANT

## 2024-07-04 PROCEDURE — 99213 OFFICE O/P EST LOW 20 MIN: CPT | Performed by: PHYSICIAN ASSISTANT

## 2024-07-04 RX ORDER — CEPHALEXIN 500 MG/1
500 CAPSULE ORAL EVERY 8 HOURS SCHEDULED
Qty: 21 CAPSULE | Refills: 0 | Status: SHIPPED | OUTPATIENT
Start: 2024-07-04 | End: 2024-07-11

## 2024-07-04 RX ORDER — CLOTRIMAZOLE AND BETAMETHASONE DIPROPIONATE 10; .64 MG/G; MG/G
CREAM TOPICAL 2 TIMES DAILY
Qty: 45 G | Refills: 1 | Status: SHIPPED | OUTPATIENT
Start: 2024-07-04

## 2024-07-04 NOTE — PROGRESS NOTES
[facility]    NAME: Jovan Blakely is a 49 y.o. male  : 1975    MRN: [id number]  DATE: 2024  TIME: 2:02 PM    Assessment and Plan   Intertrigo [L30.4]  1. Intertrigo  clotrimazole-betamethasone (LOTRISONE) 1-0.05 % cream    cephalexin (KEFLEX) 500 mg capsule      2. Adenitis            Patient Instructions     Patient Instructions   Cream as directed.  If not improving, follow up with pcp.    Chief Complaint     Chief Complaint   Patient presents with    Rash     Under armpits developing over last week, red and irritated. No change in hygiene routine       History of Present Illness   49-year-old male here with erythematous itchy rash in both armpits that started about a week ago.  It is very itchy.  Now starting with some lumps in his armpits that are a little painful.  No fevers.        Review of Systems   Review of Systems   Constitutional:  Negative for chills, fatigue and fever.   HENT:  Negative for congestion, ear pain, postnasal drip, sinus pressure and sore throat.    Respiratory:  Negative for cough, shortness of breath and wheezing.    Skin:  Positive for rash.   Neurological:  Negative for headaches.   Hematological:  Positive for adenopathy.   All other systems reviewed and are negative.      Current Medications     Current Outpatient Medications:     cephalexin (KEFLEX) 500 mg capsule, Take 1 capsule (500 mg total) by mouth every 8 (eight) hours for 7 days, Disp: 21 capsule, Rfl: 0    clotrimazole-betamethasone (LOTRISONE) 1-0.05 % cream, Apply topically 2 (two) times a day, Disp: 45 g, Rfl: 1    predniSONE 10 mg tablet, Take 6 pills x 2 days, 5 pills x 2 days, 4 pills x 3 days, 3 pills x 3 days, 2 pills x 2 days, and 1 pill x 2 days (Patient not taking: Reported on 2024), Disp: 49 tablet, Rfl: 0    Current Allergies     Allergies as of 2024    (No Known Allergies)          The following portions of the patient's history were reviewed and updated as appropriate:  "allergies, current medications, past family history, past medical history, past social history, past surgical history and problem list.   Past Medical History:   Diagnosis Date    Knee pain, left     Medial meniscus tear     left knee     Past Surgical History:   Procedure Laterality Date    ORCHIECTOMY Right     KS ARTHRS KNE SURG W/MENISCECTOMY MED/LAT W/SHVG Left 2019    Procedure: KNEE ARTHROSCOPY- medial meniscectomy medial patellar synovectomy  medial femoral chondroplasty;  Surgeon: Brayan Anthony;  Location:  MAIN OR;  Service: Orthopedics     Family History   Problem Relation Age of Onset    Diabetes Mother     No Known Problems Father     No Known Problems Sister     No Known Problems Brother      Social History     Socioeconomic History    Marital status: /Civil Union     Spouse name: Not on file    Number of children: Not on file    Years of education: Not on file    Highest education level: Not on file   Occupational History    Not on file   Tobacco Use    Smoking status: Former     Current packs/day: 0.00     Types: Cigarettes     Quit date: 2016     Years since quittin.8    Smokeless tobacco: Never   Substance and Sexual Activity    Alcohol use: Not Currently    Drug use: Never    Sexual activity: Yes     Partners: Female   Other Topics Concern    Not on file   Social History Narrative    Not on file     Social Determinants of Health     Financial Resource Strain: Not on file   Food Insecurity: Not on file   Transportation Needs: Not on file   Physical Activity: Not on file   Stress: Not on file   Social Connections: Not on file   Intimate Partner Violence: Not on file   Housing Stability: Not on file     Medications have been verified.    Objective   /73   Pulse 99   Temp 97.7 °F (36.5 °C)   Resp 20   Ht 6' 3\" (1.905 m)   Wt 93.4 kg (206 lb)   SpO2 99%   BMI 25.75 kg/m²      Physical Exam   Physical Exam  Vitals reviewed.   Constitutional:       General: He is not " in acute distress.     Appearance: He is well-developed.   HENT:      Head: Normocephalic and atraumatic.      Right Ear: Tympanic membrane normal.      Left Ear: Tympanic membrane normal.      Nose: No mucosal edema.      Mouth/Throat:      Mouth: Oropharynx is clear and moist.   Cardiovascular:      Rate and Rhythm: Normal rate and regular rhythm.      Heart sounds: Normal heart sounds.   Pulmonary:      Effort: Pulmonary effort is normal. No respiratory distress.      Breath sounds: Normal breath sounds.   Skin:     Findings: Rash (Erythematous intertriginous rash bilateral armpits.  Also has some tender enlarged lymph nodes that seem a little bit bigger under his right armpit) present.

## 2024-08-31 ENCOUNTER — OFFICE VISIT (OUTPATIENT)
Dept: URGENT CARE | Facility: CLINIC | Age: 49
End: 2024-08-31
Payer: COMMERCIAL

## 2024-08-31 VITALS
DIASTOLIC BLOOD PRESSURE: 78 MMHG | SYSTOLIC BLOOD PRESSURE: 116 MMHG | RESPIRATION RATE: 18 BRPM | TEMPERATURE: 98.7 F | OXYGEN SATURATION: 97 % | HEART RATE: 101 BPM

## 2024-08-31 DIAGNOSIS — T63.441A BEE STING REACTION, ACCIDENTAL OR UNINTENTIONAL, INITIAL ENCOUNTER: Primary | ICD-10-CM

## 2024-08-31 PROCEDURE — 96372 THER/PROPH/DIAG INJ SC/IM: CPT | Performed by: PHYSICIAN ASSISTANT

## 2024-08-31 PROCEDURE — S9088 SERVICES PROVIDED IN URGENT: HCPCS | Performed by: PHYSICIAN ASSISTANT

## 2024-08-31 PROCEDURE — 99213 OFFICE O/P EST LOW 20 MIN: CPT | Performed by: PHYSICIAN ASSISTANT

## 2024-08-31 RX ORDER — METHYLPREDNISOLONE SODIUM SUCCINATE 125 MG/2ML
125 INJECTION, POWDER, LYOPHILIZED, FOR SOLUTION INTRAMUSCULAR; INTRAVENOUS ONCE
Status: COMPLETED | OUTPATIENT
Start: 2024-08-31 | End: 2024-08-31

## 2024-08-31 RX ADMIN — METHYLPREDNISOLONE SODIUM SUCCINATE 125 MG: 125 INJECTION, POWDER, LYOPHILIZED, FOR SOLUTION INTRAMUSCULAR; INTRAVENOUS at 18:09

## 2024-08-31 NOTE — PROGRESS NOTES
[facility]        NAME: Jovan Blakely is a 49 y.o. male  : 1975    MRN: [id number]  DATE: 2024  TIME: 6:11 PM    Assessment and Plan   Bee sting reaction, accidental or unintentional, initial encounter [T63.441A]  1. Bee sting reaction, accidental or unintentional, initial encounter  methylPREDNISolone sodium succinate (Solu-MEDROL) injection 125 mg            Patient Instructions     Patient Instructions   Solu-Medrol injection given in office.  Discussed over-the-counter antihistamine such as Benadryl/Claritin/Zyrtec.  Keep areas clean and dry.  With any progression or worsening of symptoms return or be seen in ER.      Follow up with PCP in 3-5 days.  Proceed to  ER if symptoms worsen.    Chief Complaint     Chief Complaint   Patient presents with    Insect Bite     All over, ankles, arms legs          History of Present Illness       Patient is a 49-year-old male presenting today for evaluation of multiple wasp/hornet stings x 1 day.  Patient notes yesterday he was doing some work outside, accidentally disrupted a hornets nest, states he was stung approximately 10-12 times around his ankles and a couple on his arms.  Notes that the areas have began to swell and are very painful.  Has not taken any medication limiting factors.  Denies any known allergies to bees or wasps.  Denies chest tightness, SOB, throat swelling.        Review of Systems   Review of Systems   Constitutional:  Negative for chills and fever.   HENT:  Negative for ear pain and sore throat.    Eyes:  Negative for pain and visual disturbance.   Respiratory:  Negative for cough and shortness of breath.    Cardiovascular:  Negative for chest pain and palpitations.   Gastrointestinal:  Negative for abdominal pain and vomiting.   Genitourinary:  Negative for dysuria and hematuria.   Musculoskeletal:  Negative for arthralgias and back pain.   Skin:  Positive for rash. Negative for color change.   Neurological:  Negative  for seizures and syncope.   All other systems reviewed and are negative.        Current Medications       Current Outpatient Medications:     clotrimazole-betamethasone (LOTRISONE) 1-0.05 % cream, Apply topically 2 (two) times a day (Patient not taking: Reported on 8/31/2024), Disp: 45 g, Rfl: 1    predniSONE 10 mg tablet, Take 6 pills x 2 days, 5 pills x 2 days, 4 pills x 3 days, 3 pills x 3 days, 2 pills x 2 days, and 1 pill x 2 days (Patient not taking: Reported on 7/4/2024), Disp: 49 tablet, Rfl: 0    Current Facility-Administered Medications:     methylPREDNISolone sodium succinate (Solu-MEDROL) injection 125 mg, 125 mg, Intramuscular, Once,     Current Allergies     Allergies as of 08/31/2024    (No Known Allergies)            The following portions of the patient's history were reviewed and updated as appropriate: allergies, current medications, past family history, past medical history, past social history, past surgical history and problem list.     Past Medical History:   Diagnosis Date    Knee pain, left     Medial meniscus tear     left knee       Past Surgical History:   Procedure Laterality Date    ORCHIECTOMY Right     CT ARTHRS KNE SURG W/MENISCECTOMY MED/LAT W/SHVG Left 9/12/2019    Procedure: KNEE ARTHROSCOPY- medial meniscectomy medial patellar synovectomy  medial femoral chondroplasty;  Surgeon: Brayan Anthony;  Location:  MAIN OR;  Service: Orthopedics       Family History   Problem Relation Age of Onset    Diabetes Mother     No Known Problems Father     No Known Problems Sister     No Known Problems Brother          Medications have been verified.        Objective   /78   Pulse 101   Temp 98.7 °F (37.1 °C)   Resp 18   SpO2 97%        Physical Exam     Physical Exam  Vitals reviewed.   HENT:      Head: Normocephalic.      Mouth/Throat:      Mouth: Mucous membranes are moist.      Pharynx: Oropharynx is clear.   Cardiovascular:      Rate and Rhythm: Normal rate.      Pulses: Normal  pulses.   Pulmonary:      Effort: Pulmonary effort is normal.   Skin:     Capillary Refill: Capillary refill takes less than 2 seconds.      Findings: Rash present.             Comments: Several red erythematous areas around ankles bilaterally with small central scabs consistent with stings, 2 similar areas of right and left arm, no visible foreign bodies/stingers in areas, no signs of infection   Neurological:      General: No focal deficit present.      Mental Status: He is alert and oriented to person, place, and time.

## (undated) DEVICE — TUBING SUCTION 5MM X 12 FT

## (undated) DEVICE — TOWEL SURG XR DETECT GREEN STRL RFD

## (undated) DEVICE — SYRINGE 20ML LL

## (undated) DEVICE — CURITY NON-ADHERENT STRIPS: Brand: CURITY

## (undated) DEVICE — CUFF TOURNIQUET 30 X 4 IN QUICK CONNECT DISP 1BLA

## (undated) DEVICE — PADDING CAST 6IN COTTON STRL

## (undated) DEVICE — ACE WRAP 6 IN XL STERILE

## (undated) DEVICE — STERLING XTRASHARP SHAVER GREAT WHITE SHAVER BLADE, 4.2 MM: Brand: STERLING XTRASHARP SHAVER GREAT WHITE

## (undated) DEVICE — GAUZE SPONGES,16 PLY: Brand: CURITY

## (undated) DEVICE — SUT ETHILON 4-0 PS-2 18 IN 1667G

## (undated) DEVICE — ACE WRAP 6 IN STERILE

## (undated) DEVICE — PADDING CAST 4 IN  COTTON STRL

## (undated) DEVICE — ALL PURPOSE SPONGES,NON-WOVEN, 4 PLY: Brand: CURITY

## (undated) DEVICE — CHLORAPREP HI-LITE 26ML ORANGE

## (undated) DEVICE — STOCKINETTE,IMPERVIOUS,12X48,STERILE: Brand: MEDLINE

## (undated) DEVICE — TUBING ARTHROSCOPIC WAVE  MAIN PUMP

## (undated) DEVICE — GLOVE INDICATOR UNDERGLOVE SZ 7.5 GREEN

## (undated) DEVICE — INTENDED FOR TISSUE SEPARATION, AND OTHER PROCEDURES THAT REQUIRE A SHARP SURGICAL BLADE TO PUNCTURE OR CUT.: Brand: BARD-PARKER ® CARBON RIB-BACK BLADES

## (undated) DEVICE — BETHLEHEM UNIVERSAL  ARTHRO PK: Brand: CARDINAL HEALTH

## (undated) DEVICE — NEEDLE 18 G X 1 1/2

## (undated) DEVICE — GLOVE SRG BIOGEL 7.5

## (undated) DEVICE — GARMENT,MEDLINE,DVT,INT,CALF,FOAM,MED: Brand: MEDLINE